# Patient Record
Sex: FEMALE | Race: WHITE | NOT HISPANIC OR LATINO | Employment: UNEMPLOYED | URBAN - NONMETROPOLITAN AREA
[De-identification: names, ages, dates, MRNs, and addresses within clinical notes are randomized per-mention and may not be internally consistent; named-entity substitution may affect disease eponyms.]

---

## 2019-06-19 ENCOUNTER — APPOINTMENT (EMERGENCY)
Dept: RADIOLOGY | Facility: HOSPITAL | Age: 59
End: 2019-06-19
Payer: COMMERCIAL

## 2019-06-19 ENCOUNTER — APPOINTMENT (EMERGENCY)
Dept: CT IMAGING | Facility: HOSPITAL | Age: 59
End: 2019-06-19
Payer: COMMERCIAL

## 2019-06-19 ENCOUNTER — HOSPITAL ENCOUNTER (EMERGENCY)
Facility: HOSPITAL | Age: 59
Discharge: HOME | End: 2019-06-19
Attending: EMERGENCY MEDICINE | Admitting: EMERGENCY MEDICINE
Payer: COMMERCIAL

## 2019-06-19 VITALS
TEMPERATURE: 98.2 F | HEIGHT: 67 IN | HEART RATE: 96 BPM | DIASTOLIC BLOOD PRESSURE: 58 MMHG | OXYGEN SATURATION: 97 % | WEIGHT: 172.84 LBS | BODY MASS INDEX: 27.13 KG/M2 | RESPIRATION RATE: 20 BRPM | SYSTOLIC BLOOD PRESSURE: 135 MMHG

## 2019-06-19 DIAGNOSIS — R07.89 ATYPICAL CHEST PAIN: Primary | ICD-10-CM

## 2019-06-19 LAB
ALBUMIN SERPL BCP-MCNC: 3.9 G/DL (ref 3.5–5)
ALP SERPL-CCNC: 102 U/L (ref 46–116)
ALT SERPL W P-5'-P-CCNC: 38 U/L (ref 12–78)
ANION GAP SERPL CALCULATED.3IONS-SCNC: 12 MMOL/L (ref 4–13)
AST SERPL W P-5'-P-CCNC: 18 U/L (ref 5–45)
ATRIAL RATE: 111 BPM
BASOPHILS # BLD AUTO: 0.06 THOUSANDS/ΜL (ref 0–0.1)
BASOPHILS NFR BLD AUTO: 1 % (ref 0–1)
BILIRUB SERPL-MCNC: 0.3 MG/DL (ref 0.2–1)
BUN SERPL-MCNC: 12 MG/DL (ref 5–25)
CALCIUM SERPL-MCNC: 9.6 MG/DL (ref 8.3–10.1)
CHLORIDE SERPL-SCNC: 100 MMOL/L (ref 100–108)
CO2 SERPL-SCNC: 24 MMOL/L (ref 21–32)
CREAT SERPL-MCNC: 1.1 MG/DL (ref 0.6–1.3)
EOSINOPHIL # BLD AUTO: 0.16 THOUSAND/ΜL (ref 0–0.61)
EOSINOPHIL NFR BLD AUTO: 2 % (ref 0–6)
ERYTHROCYTE [DISTWIDTH] IN BLOOD BY AUTOMATED COUNT: 14.4 % (ref 11.6–15.1)
GFR SERPL CREATININE-BSD FRML MDRD: 55 ML/MIN/1.73SQ M
GLUCOSE SERPL-MCNC: 101 MG/DL (ref 65–140)
HCT VFR BLD AUTO: 41 % (ref 34.8–46.1)
HGB BLD-MCNC: 13.3 G/DL (ref 11.5–15.4)
HOLD SPECIMEN: NORMAL
IMM GRANULOCYTES # BLD AUTO: 0.08 THOUSAND/UL (ref 0–0.2)
IMM GRANULOCYTES NFR BLD AUTO: 1 % (ref 0–2)
LYMPHOCYTES # BLD AUTO: 3.97 THOUSANDS/ΜL (ref 0.6–4.47)
LYMPHOCYTES NFR BLD AUTO: 39 % (ref 14–44)
MCH RBC QN AUTO: 29.6 PG (ref 26.8–34.3)
MCHC RBC AUTO-ENTMCNC: 32.4 G/DL (ref 31.4–37.4)
MCV RBC AUTO: 91 FL (ref 82–98)
MONOCYTES # BLD AUTO: 0.44 THOUSAND/ΜL (ref 0.17–1.22)
MONOCYTES NFR BLD AUTO: 4 % (ref 4–12)
NEUTROPHILS # BLD AUTO: 5.53 THOUSANDS/ΜL (ref 1.85–7.62)
NEUTS SEG NFR BLD AUTO: 53 % (ref 43–75)
NRBC BLD AUTO-RTO: 0 /100 WBCS
P AXIS: 52 DEGREES
PLATELET # BLD AUTO: 264 THOUSANDS/UL (ref 149–390)
PMV BLD AUTO: 8 FL (ref 8.9–12.7)
POTASSIUM SERPL-SCNC: 3.9 MMOL/L (ref 3.5–5.3)
PR INTERVAL: 144 MS
PROT SERPL-MCNC: 7.9 G/DL (ref 6.4–8.2)
QRS AXIS: 18 DEGREES
QRSD INTERVAL: 88 MS
QT INTERVAL: 336 MS
QTC INTERVAL: 456 MS
RBC # BLD AUTO: 4.49 MILLION/UL (ref 3.81–5.12)
SODIUM SERPL-SCNC: 136 MMOL/L (ref 136–145)
T WAVE AXIS: 57 DEGREES
TROPONIN I SERPL-MCNC: <0.02 NG/ML
TROPONIN I SERPL-MCNC: <0.02 NG/ML
VENTRICULAR RATE: 111 BPM
WBC # BLD AUTO: 10.24 THOUSAND/UL (ref 4.31–10.16)

## 2019-06-19 PROCEDURE — 99284 EMERGENCY DEPT VISIT MOD MDM: CPT | Performed by: EMERGENCY MEDICINE

## 2019-06-19 PROCEDURE — 99285 EMERGENCY DEPT VISIT HI MDM: CPT

## 2019-06-19 PROCEDURE — 94640 AIRWAY INHALATION TREATMENT: CPT

## 2019-06-19 PROCEDURE — 84484 ASSAY OF TROPONIN QUANT: CPT | Performed by: EMERGENCY MEDICINE

## 2019-06-19 PROCEDURE — 71046 X-RAY EXAM CHEST 2 VIEWS: CPT

## 2019-06-19 PROCEDURE — 80053 COMPREHEN METABOLIC PANEL: CPT

## 2019-06-19 PROCEDURE — 36415 COLL VENOUS BLD VENIPUNCTURE: CPT | Performed by: EMERGENCY MEDICINE

## 2019-06-19 PROCEDURE — 93010 ELECTROCARDIOGRAM REPORT: CPT | Performed by: INTERNAL MEDICINE

## 2019-06-19 PROCEDURE — 36415 COLL VENOUS BLD VENIPUNCTURE: CPT

## 2019-06-19 PROCEDURE — 93005 ELECTROCARDIOGRAM TRACING: CPT

## 2019-06-19 PROCEDURE — 85025 COMPLETE CBC W/AUTO DIFF WBC: CPT

## 2019-06-19 PROCEDURE — 84484 ASSAY OF TROPONIN QUANT: CPT

## 2019-06-19 PROCEDURE — 71275 CT ANGIOGRAPHY CHEST: CPT

## 2019-06-19 RX ORDER — NITROGLYCERIN 0.4 MG/1
0.4 TABLET SUBLINGUAL ONCE
Status: COMPLETED | OUTPATIENT
Start: 2019-06-19 | End: 2019-06-19

## 2019-06-19 RX ORDER — LORAZEPAM 2 MG/ML
1.5 INJECTION INTRAMUSCULAR ONCE
Status: DISCONTINUED | OUTPATIENT
Start: 2019-06-19 | End: 2019-06-19

## 2019-06-19 RX ORDER — IPRATROPIUM BROMIDE AND ALBUTEROL SULFATE 2.5; .5 MG/3ML; MG/3ML
3 SOLUTION RESPIRATORY (INHALATION) ONCE
Status: DISCONTINUED | OUTPATIENT
Start: 2019-06-19 | End: 2019-06-19

## 2019-06-19 RX ORDER — ASPIRIN 81 MG/1
324 TABLET, CHEWABLE ORAL ONCE
Status: DISCONTINUED | OUTPATIENT
Start: 2019-06-19 | End: 2019-06-19

## 2019-06-19 RX ORDER — CLONAZEPAM 0.5 MG/1
1 TABLET ORAL ONCE
Status: COMPLETED | OUTPATIENT
Start: 2019-06-19 | End: 2019-06-19

## 2019-06-19 RX ORDER — IPRATROPIUM BROMIDE AND ALBUTEROL SULFATE 2.5; .5 MG/3ML; MG/3ML
3 SOLUTION RESPIRATORY (INHALATION) ONCE
Status: COMPLETED | OUTPATIENT
Start: 2019-06-19 | End: 2019-06-19

## 2019-06-19 RX ADMIN — NITROGLYCERIN 0.4 MG: 0.4 TABLET SUBLINGUAL at 16:48

## 2019-06-19 RX ADMIN — CLONAZEPAM 1 MG: 0.5 TABLET ORAL at 17:34

## 2019-06-19 RX ADMIN — IPRATROPIUM BROMIDE AND ALBUTEROL SULFATE 3 ML: 2.5; .5 SOLUTION RESPIRATORY (INHALATION) at 13:08

## 2019-06-19 RX ADMIN — IOHEXOL 85 ML: 350 INJECTION, SOLUTION INTRAVENOUS at 14:28

## 2019-07-18 ENCOUNTER — HOSPITAL ENCOUNTER (EMERGENCY)
Facility: HOSPITAL | Age: 59
Discharge: HOME | End: 2019-07-18
Attending: EMERGENCY MEDICINE | Admitting: EMERGENCY MEDICINE
Payer: COMMERCIAL

## 2019-07-18 VITALS
SYSTOLIC BLOOD PRESSURE: 104 MMHG | HEART RATE: 104 BPM | BODY MASS INDEX: 28.03 KG/M2 | HEIGHT: 67 IN | TEMPERATURE: 98.4 F | DIASTOLIC BLOOD PRESSURE: 72 MMHG | WEIGHT: 178.57 LBS | OXYGEN SATURATION: 98 % | RESPIRATION RATE: 20 BRPM

## 2019-07-18 DIAGNOSIS — F41.9 ANXIETY: Primary | ICD-10-CM

## 2019-07-18 PROCEDURE — 99284 EMERGENCY DEPT VISIT MOD MDM: CPT | Performed by: PHYSICIAN ASSISTANT

## 2019-07-18 PROCEDURE — 99283 EMERGENCY DEPT VISIT LOW MDM: CPT

## 2019-07-18 RX ORDER — QUETIAPINE FUMARATE 400 MG/1
800 TABLET, FILM COATED ORAL
COMMUNITY
Start: 2019-06-03

## 2019-07-18 RX ORDER — HYDROXYZINE HYDROCHLORIDE 25 MG/1
25 TABLET, FILM COATED ORAL ONCE
Status: DISCONTINUED | OUTPATIENT
Start: 2019-07-18 | End: 2019-07-18 | Stop reason: HOSPADM

## 2019-07-18 RX ORDER — ASPIRIN 81 MG/1
81 TABLET ORAL DAILY
COMMUNITY

## 2019-07-18 RX ORDER — LORAZEPAM 1 MG/1
1 TABLET ORAL ONCE
Status: COMPLETED | OUTPATIENT
Start: 2019-07-18 | End: 2019-07-18

## 2019-07-18 RX ORDER — OMEPRAZOLE 20 MG/1
20 TABLET, DELAYED RELEASE ORAL DAILY
COMMUNITY
Start: 2019-05-29

## 2019-07-18 RX ORDER — ALPRAZOLAM 1 MG/1
TABLET ORAL
COMMUNITY
Start: 2011-04-08

## 2019-07-18 RX ORDER — PAROXETINE HYDROCHLORIDE 40 MG/1
40 TABLET, FILM COATED ORAL DAILY
COMMUNITY
Start: 2019-05-14

## 2019-07-18 RX ORDER — CLONAZEPAM 0.5 MG/1
0.5 TABLET ORAL 2 TIMES DAILY PRN
COMMUNITY
Start: 2019-06-28 | End: 2019-07-28

## 2019-07-18 RX ORDER — ALBUTEROL SULFATE 90 UG/1
AEROSOL, METERED RESPIRATORY (INHALATION)
COMMUNITY
Start: 2013-11-04

## 2019-07-18 RX ADMIN — LORAZEPAM 1 MG: 1 TABLET ORAL at 19:11

## 2019-07-18 NOTE — ED PROVIDER NOTES
History  Chief Complaint   Patient presents with    Anxiety     pt c/o increased anxiety, pcp on vacation, and instructed to come to ER for eval       62year old female presents with spouse for evaluation of increased anxiety  She notes this has been a chronic condition for her for the past 16 years  She notes she was taking seroquel 300mg daily as well as xanax 1 mg four times a day  She states she moved to [place] 2 5 years ago and then decided to move back  She was previously seen by psychiatry who she states put her on the regimen of seroquel and xanax which helped  She then notes her PCP Dr Jerrod Sullivan took over prescribing her meds before she moved to [place]  She notes since returning and seeing him, he has refused to give her xanax and she's been prescribed klonopin 0 5 mg twice a day, which pt notes "is like taking a lifesaver" and her seroquel was increased to 800 mg, which she states doesn't agree with her  C/o severe anxiety  She states she's been afraid to leave the house and this has made her more depressed  Denies SI/HI  She describes herself as feeling restless, shakey, irritable, anxious  She states "I just want to get back on the meds that worked for me"  History provided by:  Patient   used: No    Anxiety   Presenting symptoms: agitation and depression    Presenting symptoms: no aggressive behavior, no bizarre behavior, no delusions, no disorganized speech, no hallucinations, no homicidal ideas, no paranoid behavior, no self-mutilation and no suicidal thoughts    Patient accompanied by: spouse    Progression:  Worsening  Chronicity:  Chronic  Context: stressful life event    Associated symptoms: anxiety    Associated symptoms: no abdominal pain, no chest pain, no fatigue and no headaches    Risk factors: hx of mental illness    Risk factors: no hx of suicide attempts and no recent psychiatric admission        Prior to Admission Medications Prescriptions Last Dose Informant Patient Reported? Taking? ALPRAZolam (XANAX) 1 mg tablet Not Taking at Unknown time  Yes No   Sig: Take by mouth   PARoxetine (PAXIL) 40 MG tablet   Yes Yes   Sig: Take 40 mg by mouth daily   QUEtiapine (SEROquel) 400 MG tablet   Yes Yes   Sig: Take 800 mg by mouth   albuterol (PROAIR HFA) 90 mcg/act inhaler Not Taking at Unknown time  Yes No   Sig: Inhale   aspirin (ECOTRIN LOW STRENGTH) 81 mg EC tablet   Yes Yes   Sig: Take 81 mg by mouth daily   clonazePAM (KLONOPIN) 0 5 mg tablet Not Taking at Unknown time  Yes No   Sig: Take 0 5 mg by mouth 2 (two) times a day as needed   omeprazole (PRILOSEC OTC) 20 MG tablet   Yes Yes   Sig: Take 20 mg by mouth daily   tiotropium (SPIRIVA) 18 mcg inhalation capsule   Yes Yes   Sig: Place 18 mcg into inhaler and inhale daily      Facility-Administered Medications: None       Past Medical History:   Diagnosis Date    [facility]ascular necrosis ([facility] )     Steward's esophagus     Cancer ([place] [address] )     COPD (chronic obstructive pulmonary disease) (HCC)     Factor 5 Leiden mutation, heterozygous ([place] [address] )     Hyperlipidemia        Past Surgical History:   Procedure Laterality Date    CHOLECYSTECTOMY      TONSILLECTOMY         History reviewed  No pertinent family history  I have reviewed and agree with the history as documented  Social History     Tobacco Use    Smoking status: Current Every Day Smoker     Packs/day: 0 50     Types: Cigarettes    Smokeless tobacco: Never Used   Substance Use Topics    Alcohol use: Not Currently    Drug use: Not Currently        Review of Systems   Constitutional: Negative  Negative for chills, fatigue and fever  HENT: Negative  Negative for postnasal drip, rhinorrhea and sore throat  Eyes: Negative  Negative for visual disturbance  Respiratory: Negative  Negative for cough, shortness of breath and wheezing  Cardiovascular: Negative  Negative for chest pain, palpitations and leg swelling  Gastrointestinal: Negative  Negative for abdominal pain, constipation, diarrhea, nausea and vomiting  Genitourinary: Negative  Negative for dysuria, flank pain and hematuria  Musculoskeletal: Positive for neck pain  Negative for back pain and myalgias  Skin: Negative  Negative for rash  Neurological: Negative  Negative for dizziness, syncope, weakness, light-headedness, numbness and headaches  Psychiatric/Behavioral: Positive for agitation, dysphoric mood and sleep disturbance  Negative for confusion, hallucinations, homicidal ideas, paranoia, self-injury and suicidal ideas  The patient is nervous/anxious  All other systems reviewed and are negative  Physical Exam  Physical Exam   Constitutional: She is oriented to person, place, and time  She appears well-developed and well-nourished  No distress  HENT:   Head: Normocephalic and atraumatic  Right Ear: External ear normal    Left Ear: External ear normal    Nose: Nose normal    Mouth/Throat: Uvula is midline, oropharynx is clear and moist and mucous membranes are normal  No oropharyngeal exudate  Eyes: Pupils are equal, round, and reactive to light  Conjunctivae and EOM are normal  No scleral icterus  Neck: Trachea normal and normal range of motion  Neck supple  No tracheal deviation present  Cardiovascular: Normal rate, regular rhythm, normal heart sounds and normal pulses  No murmur heard  Pulmonary/Chest: Effort normal and breath sounds normal  No respiratory distress  She has no wheezes  She has no rhonchi  She has no rales  Abdominal: Soft  Bowel sounds are normal  There is no tenderness  There is no rebound, no guarding and no CVA tenderness  Musculoskeletal: Normal range of motion  She exhibits no edema or tenderness  Neurological: She is alert and oriented to person, place, and time  No cranial nerve deficit or sensory deficit  She exhibits normal muscle tone  Skin: Skin is warm and dry   Capillary refill takes less than 2 seconds  No rash noted  Psychiatric: Her speech is normal and behavior is normal  Her mood appears anxious  She is not actively hallucinating  She expresses no homicidal and no suicidal ideation  Nursing note and vitals reviewed  Vital Signs  ED Triage Vitals [07/18/19 1753]   Temperature Pulse Respirations Blood Pressure SpO2   98 4 °F (36 9 °C) (!) 106 20 161/79 98 %      Temp Source Heart Rate Source Patient Position - Orthostatic VS BP Location FiO2 (%)   Temporal Monitor Sitting Right arm --      Pain Score       9           Vitals:    07/18/19 1753 07/18/19 1800 07/18/19 1830 07/18/19 1900   BP: 161/79 (!) 151/106 136/58 104/72   Pulse: (!) 106 (!) 106 104 104   Patient Position - Orthostatic VS: Sitting Sitting Sitting Sitting         Visual Acuity      ED Medications  Medications   hydrOXYzine HCL (ATARAX) tablet 25 mg (25 mg Oral Not Given 7/18/19 1858)   LORazepam (ATIVAN) tablet 1 mg (1 mg Oral Given 7/18/19 1911)       Diagnostic Studies  Results Reviewed     None                 No orders to display              Procedures  Procedures       ED Course  ED Course as of Jul 18 2109   Thu Jul 18, 2019   Sarina Rosa [facility] Previous ER records here reviewed  Had recent full work up to include CTA chest to r/o PE  Pt denying chest pain or SOB  Her only complaint is anxiety related to being off her meds  Pt refused the hydroxyzine  Discussed PDMP findings with pt and spouse and concerns re: benzos  She does admit that she's been out of the klonopin as she's been taking two at a time to help  She states the hydroxyzine does nothing for her as her  already takes that  Expressed my concerns regarding her use of benzos, going to multiple providers to get scripts, using more than directed, etc   She feels her currently prescribed dose is not effective compared to xanax 1 mg QID  On PDMP, I did relay to her that she hasn't been given a script for xanax since 12/2018 and it wasn't that dose    She notes she was out of it for several months  1859 She is not suicidal or felt to be a harm to herself  She declines inpatient psychiatric admission and states she "just wants to get back on the meds she was on"  Discussed with pt that I would not be giving her a printed Rx for any benzos  She requested at least a dose here in the ED to help calm her down until she can call her doctor's office tomorrow for follow up  She was given ativan 1 mg POX1  She was deemed appropriate for discharge home and outpatient follow up with her PCP  Also recommended consultation with psychiatry  She left in stable condition with her spouse  Pt verbalized understanding and had no further questions  PDMP was queried to include [place] in report  Pt last received xanax 12/2018 and was 0 5 mg dose, #30  She received multiple benzo Rx's from different prescribers and didn't have a consistent dose  She received clonopin 0 5 mg, #60 from Dr Kareem Hayes on 5/29/19 and 6/26/19 both times  She does admit to being out of the clonopin as she was taking 2 tabs at a time to achieve a better affect  MDM  Number of Diagnoses or Management Options  Anxiety: established and worsening     Amount and/or Complexity of Data Reviewed  Decide to obtain previous medical records or to obtain history from someone other than the patient: yes  Review and summarize past medical records: yes    Patient Progress  Patient progress: stable      Disposition  Final diagnoses:   Anxiety     Time reflects when diagnosis was documented in both MDM as applicable and the Disposition within this note     Time User Action Codes Description Comment    7/18/2019  7:03 PM Tara Gray Add [F41 9] Anxiety       ED Disposition     ED Disposition Condition Date/Time Comment    Discharge Stable Thu Jul 18, 2019  7:03 PM Azalia Keita discharge to home/self care              Follow-up Information     Follow up With Specialties Details Why Contact Info Additional Information    Tab Araujo MD Internal Medicine Schedule an appointment as soon as possible for a visit   [address] [facility] [address]  [phone]       [address] [facility] [address] [facility] HEART AND SURGICAL [facility] Schedule an appointment as soon as possible for a visit   [address] [facility] [facility] [address]  [address] [facility], [address] [facility], [address] [place], [place], [address]          Discharge Medication List as of 7/18/2019  7:07 PM      CONTINUE these medications which have NOT CHANGED    Details   aspirin (ECOTRIN LOW STRENGTH) 81 mg EC tablet Take 81 mg by mouth daily, Historical Med      omeprazole (PRILOSEC OTC) 20 MG tablet Take 20 mg by mouth daily, Starting Wed 5/29/2019, Historical Med      PARoxetine (PAXIL) 40 MG tablet Take 40 mg by mouth daily, Starting Tue 5/14/2019, Historical Med      QUEtiapine (SEROquel) 400 MG tablet Take 800 mg by mouth, Starting Mon 6/3/2019, Historical Med      tiotropium (SPIRIVA) 18 mcg inhalation capsule Place 18 mcg into inhaler and inhale daily, Historical Med      albuterol (PROAIR HFA) 90 mcg/act inhaler Inhale, Starting Mon 11/4/2013, Historical Med      ALPRAZolam (XANAX) 1 mg tablet Take by mouth, Starting Fri 4/8/2011, Historical Med      clonazePAM (KLONOPIN) 0 5 mg tablet Take 0 5 mg by mouth 2 (two) times a day as needed, Starting Fri 6/28/2019, Until Sun 7/28/2019, Historical Med           No discharge procedures on file      ED Provider  Electronically Signed by           Mandie Caruso PA-C  07/18/19 8721

## 2020-01-30 ENCOUNTER — HOSPITAL ENCOUNTER (EMERGENCY)
Facility: HOSPITAL | Age: 60
Discharge: LEFT BEFORE DISCHARGE | End: 2020-01-30
Admitting: EMERGENCY MEDICINE
Payer: COMMERCIAL

## 2020-01-30 ENCOUNTER — APPOINTMENT (EMERGENCY)
Dept: RADIOLOGY | Facility: HOSPITAL | Age: 60
End: 2020-01-30
Payer: COMMERCIAL

## 2020-01-30 ENCOUNTER — APPOINTMENT (EMERGENCY)
Dept: CT IMAGING | Facility: HOSPITAL | Age: 60
End: 2020-01-30
Payer: COMMERCIAL

## 2020-01-30 VITALS
TEMPERATURE: 98.3 F | SYSTOLIC BLOOD PRESSURE: 124 MMHG | HEART RATE: 107 BPM | DIASTOLIC BLOOD PRESSURE: 71 MMHG | WEIGHT: 182.76 LBS | RESPIRATION RATE: 19 BRPM | BODY MASS INDEX: 28.62 KG/M2 | OXYGEN SATURATION: 96 %

## 2020-01-30 DIAGNOSIS — R06.02 SHORTNESS OF BREATH: Primary | ICD-10-CM

## 2020-01-30 LAB
ANION GAP SERPL CALCULATED.3IONS-SCNC: 9 MMOL/L (ref 4–13)
BASOPHILS # BLD AUTO: 0.04 THOUSANDS/ΜL (ref 0–0.1)
BASOPHILS NFR BLD AUTO: 1 % (ref 0–1)
BUN SERPL-MCNC: 13 MG/DL (ref 5–25)
CALCIUM SERPL-MCNC: 9.2 MG/DL (ref 8.3–10.1)
CHLORIDE SERPL-SCNC: 103 MMOL/L (ref 100–108)
CO2 SERPL-SCNC: 23 MMOL/L (ref 21–32)
CREAT SERPL-MCNC: 1.06 MG/DL (ref 0.6–1.3)
D DIMER PPP FEU-MCNC: 2.84 UG/ML FEU
EOSINOPHIL # BLD AUTO: 0 THOUSAND/ΜL (ref 0–0.61)
EOSINOPHIL NFR BLD AUTO: 0 % (ref 0–6)
ERYTHROCYTE [DISTWIDTH] IN BLOOD BY AUTOMATED COUNT: 12.5 % (ref 11.6–15.1)
GFR SERPL CREATININE-BSD FRML MDRD: 58 ML/MIN/1.73SQ M
GLUCOSE SERPL-MCNC: 110 MG/DL (ref 65–140)
HCT VFR BLD AUTO: 33.3 % (ref 34.8–46.1)
HGB BLD-MCNC: 11 G/DL (ref 11.5–15.4)
HOLD SPECIMEN: NORMAL
IMM GRANULOCYTES # BLD AUTO: 0.05 THOUSAND/UL (ref 0–0.2)
IMM GRANULOCYTES NFR BLD AUTO: 1 % (ref 0–2)
LYMPHOCYTES # BLD AUTO: 2.25 THOUSANDS/ΜL (ref 0.6–4.47)
LYMPHOCYTES NFR BLD AUTO: 31 % (ref 14–44)
MCH RBC QN AUTO: 30.6 PG (ref 26.8–34.3)
MCHC RBC AUTO-ENTMCNC: 33 G/DL (ref 31.4–37.4)
MCV RBC AUTO: 93 FL (ref 82–98)
MONOCYTES # BLD AUTO: 0.42 THOUSAND/ΜL (ref 0.17–1.22)
MONOCYTES NFR BLD AUTO: 6 % (ref 4–12)
NEUTROPHILS # BLD AUTO: 4.55 THOUSANDS/ΜL (ref 1.85–7.62)
NEUTS SEG NFR BLD AUTO: 61 % (ref 43–75)
NRBC BLD AUTO-RTO: 0 /100 WBCS
PLATELET # BLD AUTO: 252 THOUSANDS/UL (ref 149–390)
PMV BLD AUTO: 8 FL (ref 8.9–12.7)
POTASSIUM SERPL-SCNC: 3.7 MMOL/L (ref 3.5–5.3)
RBC # BLD AUTO: 3.6 MILLION/UL (ref 3.81–5.12)
SODIUM SERPL-SCNC: 135 MMOL/L (ref 136–145)
TROPONIN I SERPL-MCNC: <0.02 NG/ML
WBC # BLD AUTO: 7.31 THOUSAND/UL (ref 4.31–10.16)

## 2020-01-30 PROCEDURE — 85025 COMPLETE CBC W/AUTO DIFF WBC: CPT | Performed by: STUDENT IN AN ORGANIZED HEALTH CARE EDUCATION/TRAINING PROGRAM

## 2020-01-30 PROCEDURE — 84484 ASSAY OF TROPONIN QUANT: CPT | Performed by: STUDENT IN AN ORGANIZED HEALTH CARE EDUCATION/TRAINING PROGRAM

## 2020-01-30 PROCEDURE — 71046 X-RAY EXAM CHEST 2 VIEWS: CPT

## 2020-01-30 PROCEDURE — 36415 COLL VENOUS BLD VENIPUNCTURE: CPT | Performed by: STUDENT IN AN ORGANIZED HEALTH CARE EDUCATION/TRAINING PROGRAM

## 2020-01-30 PROCEDURE — 99284 EMERGENCY DEPT VISIT MOD MDM: CPT | Performed by: EMERGENCY MEDICINE

## 2020-01-30 PROCEDURE — 99284 EMERGENCY DEPT VISIT MOD MDM: CPT

## 2020-01-30 PROCEDURE — 85379 FIBRIN DEGRADATION QUANT: CPT | Performed by: STUDENT IN AN ORGANIZED HEALTH CARE EDUCATION/TRAINING PROGRAM

## 2020-01-30 PROCEDURE — 80048 BASIC METABOLIC PNL TOTAL CA: CPT | Performed by: STUDENT IN AN ORGANIZED HEALTH CARE EDUCATION/TRAINING PROGRAM

## 2020-01-30 RX ORDER — ALBUTEROL SULFATE 2.5 MG/3ML
2.5 SOLUTION RESPIRATORY (INHALATION) EVERY 6 HOURS PRN
Qty: 30 VIAL | Refills: 0 | Status: SHIPPED | OUTPATIENT
Start: 2020-01-30

## 2020-01-30 RX ORDER — IPRATROPIUM BROMIDE AND ALBUTEROL SULFATE 2.5; .5 MG/3ML; MG/3ML
3 SOLUTION RESPIRATORY (INHALATION) ONCE
Status: COMPLETED | OUTPATIENT
Start: 2020-01-30 | End: 2020-01-30

## 2020-01-30 RX ORDER — METHYLPREDNISOLONE 4 MG/1
TABLET ORAL
Qty: 21 TABLET | Refills: 0 | Status: SHIPPED | OUTPATIENT
Start: 2020-01-30

## 2020-01-30 RX ORDER — AZITHROMYCIN 250 MG/1
TABLET, FILM COATED ORAL
Qty: 6 TABLET | Refills: 0 | Status: SHIPPED | OUTPATIENT
Start: 2020-01-30 | End: 2020-02-03

## 2020-01-30 RX ORDER — IPRATROPIUM BROMIDE AND ALBUTEROL SULFATE 2.5; .5 MG/3ML; MG/3ML
3 SOLUTION RESPIRATORY (INHALATION)
Status: DISCONTINUED | OUTPATIENT
Start: 2020-01-30 | End: 2020-01-30

## 2020-01-30 RX ADMIN — IPRATROPIUM BROMIDE AND ALBUTEROL SULFATE 3 ML: 2.5; .5 SOLUTION RESPIRATORY (INHALATION) at 12:41

## 2020-01-30 NOTE — ED ATTENDING ATTESTATION
1/30/2020  I, Enma Love MD, saw and evaluated the patient  I have discussed the patient with the resident/non-physician practitioner and agree with the resident's/non-physician practitioner's findings, Plan of Care, and MDM as documented in the resident's/non-physician practitioner's note, except where noted  All available labs and Radiology studies were reviewed  I was present for key portions of any procedure(s) performed by the resident/non-physician practitioner and I was immediately available to provide assistance  At this point I agree with the current assessment done in the Emergency Department  I have conducted an independent evaluation of this patient a history and physical is as follows:    62 yo female presents with increased shortness of breath over the last week  Patient has history of COPD she ran out of her albuterol nebulizer solution was been using her albuterol meter dose inhaler she has had upper respiratory symptoms she has also been bothered by chest congestion  No fever or chills  ocassional chest pain; np pior hx of DVT, PE    O: VS reviewed  Chest examined after aerogen neb  Good excursion but insp/exp wheezes  CV s1s3 no murmurs   abdm soft  Ext without edema    ED Course      DDx; copd exacerbation, pneumonia, acs, PE    CXR reviewed minimal bibasilar atelectasis vs  Prior 6/19/19  Labs reviewed patient has elevated d-dimer; patient expressed desire to leave prior to completing, EKG, CTA Chest  Dr Jose Paiz reviewed risks benefits of leaving against medical advice    Patient aware can return at any time for re-eval recommend f/u with PMD asap   She will be prescirbed nebulizer soln,steriod burst, and zithromax     IMP: copd exacerbation  Plan: left against medical advice       Critical Care Time  Procedures

## 2020-01-30 NOTE — ED PROVIDER NOTES
History  Chief Complaint   Patient presents with    Cough     Patient has been experiencing a cough for about 1 week, patient admits to SOB with cough and exertion  Patient also concerned about a "lump" in abdomen which is not new     HPI:  59-year-old female presents emergency room for 1 week shortness of breath  Patient states approximately 1 week ago symptoms started with URI like symptoms, , nasal congestion sinus pressure, runny nose which has progressively worsened regarding respiratory status  She has been taking home albuterol with mild relief symptoms, does have a home nebulizer however has no more medication has not been taking it  Denies any recent travel, denies any sick contacts  She is a current smoker with previous diagnosis of COPD and asthma  Has never required hospitalization or intubation  Additionally she is endorsing some chest congestion/discomfort  Prior to Admission Medications   Prescriptions Last Dose Informant Patient Reported? Taking?    ALPRAZolam (XANAX) 1 mg tablet   Yes No   Sig: Take by mouth   PARoxetine (PAXIL) 40 MG tablet   Yes No   Sig: Take 40 mg by mouth daily   QUEtiapine (SEROquel) 400 MG tablet   Yes No   Sig: Take 800 mg by mouth   albuterol (PROAIR HFA) 90 mcg/act inhaler   Yes No   Sig: Inhale   aspirin (ECOTRIN LOW STRENGTH) 81 mg EC tablet   Yes No   Sig: Take 81 mg by mouth daily   omeprazole (PRILOSEC OTC) 20 MG tablet   Yes No   Sig: Take 20 mg by mouth daily   tiotropium (SPIRIVA) 18 mcg inhalation capsule   Yes No   Sig: Place 18 mcg into inhaler and inhale daily      Facility-Administered Medications: None       Past Medical History:   Diagnosis Date    Av[facility] necrosis ([facility] )     Steward's esophagus     Cancer ([facility] )     COPD (chronic obstructive pulmonary disease) (HCC)     Factor 5 Leiden mutation, heterozygous ([facility] )     Hyperlipidemia        Past Surgical History:   Procedure Laterality Date    CHOLECYSTECTOMY      TONSILLECTOMY History reviewed  No pertinent family history  I have reviewed and agree with the history as documented  Social History     Tobacco Use    Smoking status: Current Every Day Smoker     Packs/day: 0 50     Types: Cigarettes    Smokeless tobacco: Never Used   Substance Use Topics    Alcohol use: Not Currently    Drug use: Not Currently        Review of Systems   Constitutional: Negative for activity change, appetite change, chills, diaphoresis, fatigue, fever and unexpected weight change  HENT: Positive for congestion, ear pain, postnasal drip and sinus pain  Negative for dental problem, drooling, ear discharge, facial swelling, hearing loss, mouth sores, nosebleeds, rhinorrhea, sinus pressure, sneezing, sore throat, tinnitus, trouble swallowing and voice change  Eyes: Negative for photophobia, pain, discharge, redness, itching and visual disturbance  Respiratory: Positive for cough, shortness of breath and wheezing  Negative for apnea and chest tightness  Cardiovascular: Positive for chest pain  Negative for palpitations and leg swelling  Gastrointestinal: Negative for abdominal distention, abdominal pain, anal bleeding, constipation, diarrhea and nausea  Endocrine: Negative for cold intolerance, heat intolerance and polydipsia  Genitourinary: Negative for difficulty urinating  Musculoskeletal: Negative for arthralgias, gait problem, joint swelling and myalgias  Skin: Negative for color change and pallor  Allergic/Immunologic: Negative for immunocompromised state  Neurological: Negative for dizziness, seizures, facial asymmetry, weakness, light-headedness, numbness and headaches  Psychiatric/Behavioral: Negative for agitation, behavioral problems, confusion, decreased concentration and dysphoric mood         Physical Exam  ED Triage Vitals [01/30/20 1132]   Temperature Pulse Respirations Blood Pressure SpO2   98 3 °F (36 8 °C) (!) 107 19 124/71 96 %      Temp Source Heart Rate Source Patient Position - Orthostatic VS BP Location FiO2 (%)   Temporal Monitor Sitting Right arm --      Pain Score       8             Orthostatic Vital Signs  Vitals:    01/30/20 1132   BP: 124/71   Pulse: (!) 107   Patient Position - Orthostatic VS: Sitting       Physical Exam   Constitutional: She is oriented to person, place, and time  She appears well-developed and well-nourished  No distress  She is not intubated  HENT:   Head: Normocephalic and atraumatic  Nose: Mucosal edema and rhinorrhea present  Right sinus exhibits no maxillary sinus tenderness and no frontal sinus tenderness  Left sinus exhibits no maxillary sinus tenderness and no frontal sinus tenderness  Mouth/Throat: Mucous membranes are normal  Posterior oropharyngeal erythema present  Eyes: Pupils are equal, round, and reactive to light  Conjunctivae and EOM are normal    Neck: Normal range of motion  Neck supple  No JVD present  No tracheal deviation present  No thyromegaly present  Cardiovascular: Normal rate, regular rhythm and normal heart sounds  Exam reveals no friction rub  No murmur heard  Pulmonary/Chest: Effort normal  No accessory muscle usage  No apnea, no tachypnea and no bradypnea  She is not intubated  No respiratory distress  She has decreased breath sounds  She has wheezes  She has rhonchi  Abdominal: Soft  Bowel sounds are normal  She exhibits no distension and no mass  There is no tenderness  There is no guarding  Musculoskeletal: Normal range of motion  She exhibits no edema  Lymphadenopathy:     She has no cervical adenopathy  Neurological: She is alert and oriented to person, place, and time  She exhibits normal muscle tone  Coordination normal    Skin: Skin is warm  Capillary refill takes less than 2 seconds  Psychiatric: She has a normal mood and affect   Her behavior is normal  Thought content normal        ED Medications  Medications   ipratropium-albuterol (DUO-NEB) 0 5-2 5 mg/3 mL inhalation solution 3 mL (3 mL Nebulization Given 1/30/20 1241)       Diagnostic Studies  Results Reviewed     Procedure Component Value Units Date/Time    [place] draw [[id number]] Collected:  01/30/20 1207    Lab Status:  Final result Specimen:  Blood from Arm, Left Updated:  01/30/20 1401    Narrative: The following orders were created for panel order [place] draw  Procedure                               Abnormality         Status                     ---------                               -----------         ------                     Nesha Flatten / Yellow tube on XZTS[[id number]]                      Final result                 Please view results for these tests on the individual orders      Basic metabolic panel [[id number]]  (Abnormal) Collected:  01/30/20 1207    Lab Status:  Final result Specimen:  Blood from Arm, Left Updated:  01/30/20 1303     Sodium 135 mmol/L      Potassium 3 7 mmol/L      Chloride 103 mmol/L      CO2 23 mmol/L      ANION GAP 9 mmol/L      BUN 13 mg/dL      Creatinine 1 06 mg/dL      Glucose 110 mg/dL      Calcium 9 2 mg/dL      eGFR 58 ml/min/1 73sq m     Narrative:       [place] guidelines for Chronic Kidney Disease (CKD):     Stage 1 with normal or high GFR (GFR > 90 mL/min/1 73 square meters)    Stage 2 Mild CKD (GFR = 60-89 mL/min/1 73 square meters)    Stage 3A Moderate CKD (GFR = 45-59 mL/min/1 73 square meters)    Stage 3B Moderate CKD (GFR = 30-44 mL/min/1 73 square meters)    Stage 4 Severe CKD (GFR = 15-29 mL/min/1 73 square meters)    Stage 5 End Stage CKD (GFR <15 mL/min/1 73 square meters)  Note: GFR calculation is accurate only with a steady state creatinine    Troponin I [[id number]]  (Normal) Collected:  01/30/20 1207    Lab Status:  Final result Specimen:  Blood from Arm, Left Updated:  01/30/20 1231     Troponin I <0 02 ng/mL     D-dimer, quantitative [[id number]]  (Abnormal) Collected:  01/30/20 1207    Lab Status:  Final result Specimen:  Blood from Arm, Left Updated:  01/30/20 1224     D-Dimer, Quant 2 84 ug/ml FEU     CBC and differential [[id number]]  (Abnormal) Collected:  01/30/20 1207    Lab Status:  Final result Specimen:  Blood from Arm, Left Updated:  01/30/20 1213     WBC 7 31 Thousand/uL      RBC 3 60 Million/uL      Hemoglobin 11 0 g/dL      Hematocrit 33 3 %      MCV 93 fL      MCH 30 6 pg      MCHC 33 0 g/dL      RDW 12 5 %      MPV 8 0 fL      Platelets 221 Thousands/uL      nRBC 0 /100 WBCs      Neutrophils Relative 61 %      Immat GRANS % 1 %      Lymphocytes Relative 31 %      Monocytes Relative 6 %      Eosinophils Relative 0 %      Basophils Relative 1 %      Neutrophils Absolute 4 55 Thousands/µL      Immature Grans Absolute 0 05 Thousand/uL      Lymphocytes Absolute 2 25 Thousands/µL      Monocytes Absolute 0 42 Thousand/µL      Eosinophils Absolute 0 00 Thousand/µL      Basophils Absolute 0 04 Thousands/µL                  XR chest 2 views   Final Result by Elmo Jenkins MD (01/30 1300)      Trace basilar atelectasis without other acute cardiopulmonary findings  Workstation performed: [id number]         CTA ED chest PE study    (Results Pending)         Procedures  Procedures      ED Course  ED Course as of Jan 30 1402   Thu Jan 30, 2020   1229 Positive Dimer, CTA Chest ordered       1400 Patient advised she would like to leave against medical advice  I spoke extensively with the patient, and concerned that she may have a pulmonary embolism given her elevated dimer which is why would like to do a CTA of the chest, and continue to monitor her respiratory status  Patient acknowledged, states she does not believe this is necessary and would like to leave against medical advice  Made her aware that should anything change or worsen she is to come back to the emergency department                    PERC Rule for PE      Most Recent Value   PERC Rule for PE   Age >=50  1 Filed at: 01/30/2020 1227   HR >=100  1 Filed at: 01/30/2020 1227   O2 Sat on room air < 95%  1 Filed at: 01/30/2020 1227   History of PE or DVT  0 Filed at: 01/30/2020 1227   Recent trauma or surgery  0 Filed at: 01/30/2020 1227   Hemoptysis  0 Filed at: 01/30/2020 1227   Exogenous estrogen  --   Unilateral leg swelling  0 Filed at: 01/30/2020 1227   PERC Rule for PE Results  3 Filed at: 01/30/2020 1227                Wells' Criteria for PE      Most Recent Value   Wells' Criteria for PE   Clinical signs and symptoms of DVT  0 Filed at: 01/30/2020 1227   PE is primary diagnosis or equally likely  0 Filed at: 01/30/2020 1227   HR >100  1 5 Filed at: 01/30/2020 1227   Immobilization at least 3 days or Surgery in the previous 4 weeks  0 Filed at: 01/30/2020 1227   Previous, objectively diagnosed PE or DVT  0 Filed at: 01/30/2020 1227   Hemoptysis  0 Filed at: 01/30/2020 1227   Malignancy with treatment within 6 months or palliative  0 Filed at: 01/30/2020 1227   Wells' Criteria Total  1 5 Filed at: 01/30/2020 1227            MDM  Number of Diagnoses or Management Options  Diagnosis management comments: Influenza, COPD, asthma, pneumonia, viral prodrome  Disposition  Final diagnoses:   Shortness of breath     Time reflects when diagnosis was documented in both MDM as applicable and the Disposition within this note     Time User Action Codes Description Comment    1/30/2020  2:01 PM Yarely Cooper Add [R06 02] Shortness of breath       ED Disposition     ED Disposition Condition Date/Time Comment    NAINSHARMILA Currie Jan 30, 2020  2:01 PM Date: 1/30/2020  Patient: Freya Prescott  Admitted: 1/30/2020 11:29 AM  Attending Provider: Dr Moises Koo or her authorized caregiver has made the decision for the patient to leave the emergency department against the advice of the [facility] department staff  She or her authorized caregiver has been informed and understands the inherent risks, including death,     She or her authorized caregiver has decided to accept the responsibility for this decision  Darci Cockayne and all [facility] parties have been advised that she may return for further evaluation or treatment  Her condition at time of discharge was poor  Darci Cockayne had current vital signs as follows:  /71 (BP Location: Right arm)   Pulse (!) 107   Temp 98 3  °F (36 8 °C) (Temporal)   Resp 19   Wt 82 9 kg (182 lb 12 2 oz)         Follow-up Information     Follow up With Specialties Details Why Contact Info Additional Information    Ben Contreras MD Internal Medicine In 1 week As needed [facility]  [phone]       [facility] Emergency Department Emergency Medicine  If symptoms worsen [place] [id number] [phone] [place] ED, [address], [address]          Patient's Medications   Discharge Prescriptions    No medications on file     No discharge procedures on file  ED Provider  Attending physically available and evaluated Darci Cockayne  I managed the patient along with the ED Attending      Electronically Signed by         Tj Loera MD  01/30/20 5657

## 2020-01-31 ENCOUNTER — APPOINTMENT (EMERGENCY)
Dept: CT IMAGING | Facility: HOSPITAL | Age: 60
End: 2020-01-31
Payer: COMMERCIAL

## 2020-01-31 ENCOUNTER — HOSPITAL ENCOUNTER (EMERGENCY)
Facility: HOSPITAL | Age: 60
Discharge: HOME | End: 2020-01-31
Attending: FAMILY MEDICINE | Admitting: FAMILY MEDICINE
Payer: COMMERCIAL

## 2020-01-31 VITALS
DIASTOLIC BLOOD PRESSURE: 66 MMHG | TEMPERATURE: 98 F | OXYGEN SATURATION: 95 % | HEIGHT: 67 IN | BODY MASS INDEX: 28.75 KG/M2 | SYSTOLIC BLOOD PRESSURE: 112 MMHG | RESPIRATION RATE: 17 BRPM | WEIGHT: 183.2 LBS | HEART RATE: 89 BPM

## 2020-01-31 DIAGNOSIS — J40 BRONCHITIS: Primary | ICD-10-CM

## 2020-01-31 LAB
ALBUMIN SERPL BCP-MCNC: 3.3 G/DL (ref 3.5–5)
ALP SERPL-CCNC: 89 U/L (ref 46–116)
ALT SERPL W P-5'-P-CCNC: 25 U/L (ref 12–78)
ANION GAP SERPL CALCULATED.3IONS-SCNC: 10 MMOL/L (ref 4–13)
AST SERPL W P-5'-P-CCNC: 18 U/L (ref 5–45)
BASOPHILS # BLD AUTO: 0.03 THOUSANDS/ΜL (ref 0–0.1)
BASOPHILS NFR BLD AUTO: 0 % (ref 0–1)
BILIRUB SERPL-MCNC: 0.3 MG/DL (ref 0.2–1)
BILIRUB UR QL STRIP: NEGATIVE
BUN SERPL-MCNC: 13 MG/DL (ref 5–25)
CALCIUM SERPL-MCNC: 9.1 MG/DL (ref 8.3–10.1)
CHLORIDE SERPL-SCNC: 104 MMOL/L (ref 100–108)
CLARITY UR: CLEAR
CO2 SERPL-SCNC: 24 MMOL/L (ref 21–32)
COLOR UR: YELLOW
CREAT SERPL-MCNC: 1.06 MG/DL (ref 0.6–1.3)
EOSINOPHIL # BLD AUTO: 0.01 THOUSAND/ΜL (ref 0–0.61)
EOSINOPHIL NFR BLD AUTO: 0 % (ref 0–6)
ERYTHROCYTE [DISTWIDTH] IN BLOOD BY AUTOMATED COUNT: 12.5 % (ref 11.6–15.1)
GFR SERPL CREATININE-BSD FRML MDRD: 58 ML/MIN/1.73SQ M
GLUCOSE SERPL-MCNC: 135 MG/DL (ref 65–140)
GLUCOSE UR STRIP-MCNC: NEGATIVE MG/DL
HCT VFR BLD AUTO: 34.4 % (ref 34.8–46.1)
HGB BLD-MCNC: 11.1 G/DL (ref 11.5–15.4)
HGB UR QL STRIP.AUTO: NEGATIVE
IMM GRANULOCYTES # BLD AUTO: 0.06 THOUSAND/UL (ref 0–0.2)
IMM GRANULOCYTES NFR BLD AUTO: 1 % (ref 0–2)
KETONES UR STRIP-MCNC: NEGATIVE MG/DL
LEUKOCYTE ESTERASE UR QL STRIP: NEGATIVE
LIPASE SERPL-CCNC: 100 U/L (ref 73–393)
LYMPHOCYTES # BLD AUTO: 2.6 THOUSANDS/ΜL (ref 0.6–4.47)
LYMPHOCYTES NFR BLD AUTO: 38 % (ref 14–44)
MCH RBC QN AUTO: 30.1 PG (ref 26.8–34.3)
MCHC RBC AUTO-ENTMCNC: 32.3 G/DL (ref 31.4–37.4)
MCV RBC AUTO: 93 FL (ref 82–98)
MONOCYTES # BLD AUTO: 0.34 THOUSAND/ΜL (ref 0.17–1.22)
MONOCYTES NFR BLD AUTO: 5 % (ref 4–12)
NEUTROPHILS # BLD AUTO: 3.86 THOUSANDS/ΜL (ref 1.85–7.62)
NEUTS SEG NFR BLD AUTO: 56 % (ref 43–75)
NITRITE UR QL STRIP: NEGATIVE
NRBC BLD AUTO-RTO: 0 /100 WBCS
PH UR STRIP.AUTO: 7 [PH]
PLATELET # BLD AUTO: 266 THOUSANDS/UL (ref 149–390)
PMV BLD AUTO: 8 FL (ref 8.9–12.7)
POTASSIUM SERPL-SCNC: 3.9 MMOL/L (ref 3.5–5.3)
PROT SERPL-MCNC: 8 G/DL (ref 6.4–8.2)
PROT UR STRIP-MCNC: NEGATIVE MG/DL
RBC # BLD AUTO: 3.69 MILLION/UL (ref 3.81–5.12)
SODIUM SERPL-SCNC: 138 MMOL/L (ref 136–145)
SP GR UR STRIP.AUTO: <=1.005 (ref 1–1.03)
TROPONIN I SERPL-MCNC: <0.02 NG/ML
UROBILINOGEN UR QL STRIP.AUTO: 0.2 E.U./DL
WBC # BLD AUTO: 6.9 THOUSAND/UL (ref 4.31–10.16)

## 2020-01-31 PROCEDURE — 84484 ASSAY OF TROPONIN QUANT: CPT | Performed by: PHYSICIAN ASSISTANT

## 2020-01-31 PROCEDURE — 81003 URINALYSIS AUTO W/O SCOPE: CPT | Performed by: PHYSICIAN ASSISTANT

## 2020-01-31 PROCEDURE — 83690 ASSAY OF LIPASE: CPT | Performed by: PHYSICIAN ASSISTANT

## 2020-01-31 PROCEDURE — 80053 COMPREHEN METABOLIC PANEL: CPT | Performed by: PHYSICIAN ASSISTANT

## 2020-01-31 PROCEDURE — 74177 CT ABD & PELVIS W/CONTRAST: CPT

## 2020-01-31 PROCEDURE — 99284 EMERGENCY DEPT VISIT MOD MDM: CPT | Performed by: PHYSICIAN ASSISTANT

## 2020-01-31 PROCEDURE — 93005 ELECTROCARDIOGRAM TRACING: CPT

## 2020-01-31 PROCEDURE — 99284 EMERGENCY DEPT VISIT MOD MDM: CPT

## 2020-01-31 PROCEDURE — 36415 COLL VENOUS BLD VENIPUNCTURE: CPT | Performed by: PHYSICIAN ASSISTANT

## 2020-01-31 PROCEDURE — 85025 COMPLETE CBC W/AUTO DIFF WBC: CPT | Performed by: PHYSICIAN ASSISTANT

## 2020-01-31 PROCEDURE — 71275 CT ANGIOGRAPHY CHEST: CPT

## 2020-01-31 RX ADMIN — IOHEXOL 100 ML: 350 INJECTION, SOLUTION INTRAVENOUS at 15:28

## 2020-01-31 NOTE — CASE MANAGEMENT
I self referred the patient to discuss resources that might be available to her  She denied needing any help at this time  The patient lives with her  in a three story house  There is 2 [address] and two flights of 14 steps inside the home  She has a cane, nebulizer, and stair climber  She does not receive meals on wheels or home health services at this time  She is independent with her ADL's and she drives  She uses [employer] in [facility]  She has no trouble getting or paying for her medications  She has [employer] insurance  She did not call her PCP prior to coming to the ED today

## 2020-01-31 NOTE — DISCHARGE INSTRUCTIONS
Finished the antibiotic and steroids as prescribed  Schedule follow up with your family doctor or return to ER as needed

## 2020-01-31 NOTE — ED PROVIDER NOTES
History  Chief Complaint   Patient presents with    Abdominal Pain     patient states she was seen here yesterday for shortness of breath and abdominal pain and was supposed to get a PE study but signed out AMA because "she had somewhere else to be" patient states she feels short of breath at times at rest and with activity; patient also states she has left sided constant abdominal pain x2 weeks      61year old female presents for evaluation of abdominal pain  Pt notes she was seen here yesterday for her "lungs" but ultimately signed out AMA as she had someone to be  She had was here related to her breathing and cough/congestion  She was told she needed a test to evaluate for a blood clot but didn't have the CT performed  In regards to her breathing and cough, she feels this has improved with her neb treatments  She is currently taking an antibiotic and steroid as prescribed  She does admit to SOB with exertion but this is improving  Denies chest pain  Also is concerned about a "lump" on the left side of her abdomen  She notes this has been here at least 15 days  No prior evaluation  Reports occasional nausea  Denies vomiting, diarrhea or constipation  Denies any urinary complaints  Denies fever, chills  History provided by:  Patient, medical records and spouse   used: No        Prior to Admission Medications   Prescriptions Last Dose Informant Patient Reported? Taking?    ALPRAZolam (XANAX) 1 mg tablet 1/31/2020 at Unknown time  Yes Yes   Sig: Take by mouth   PARoxetine (PAXIL) 40 MG tablet 1/30/2020 at Unknown time  Yes Yes   Sig: Take 40 mg by mouth daily   QUEtiapine (SEROquel) 400 MG tablet 1/30/2020 at Unknown time  Yes Yes   Sig: Take 800 mg by mouth   albuterol (2 5 mg/3 mL) 0 083 % nebulizer solution 1/30/2020 at Unknown time  No Yes   Sig: Take 1 vial (2 5 mg total) by nebulization every 6 (six) hours as needed for wheezing or shortness of breath for up to 30 doses albuterol (PROAIR HFA) 90 mcg/act inhaler 1/30/2020 at Unknown time  Yes Yes   Sig: Inhale   aspirin (ECOTRIN LOW STRENGTH) 81 mg EC tablet   Yes Yes   Sig: Take 81 mg by mouth daily   azithromycin (ZITHROMAX) 250 mg tablet Not Taking at Unknown time  No No   Sig: Take 2 tablets today then 1 tablet daily x 4 days   Patient not taking: Reported on 1/31/2020   methylPREDNISolone 4 MG tablet therapy pack Unknown at Unknown time  No No   Sig: Use as directed on package   omeprazole (PRILOSEC OTC) 20 MG tablet 1/30/2020 at Unknown time  Yes Yes   Sig: Take 20 mg by mouth daily   tiotropium (SPIRIVA) 18 mcg inhalation capsule 1/31/2020 at Unknown time  Yes Yes   Sig: Place 18 mcg into inhaler and inhale daily      Facility-Administered Medications: None       Past Medical History:   Diagnosis Date    Avascular necrosis ([place] [address] )     Steward's esophagus     Cancer ([place] [address] )     COPD (chronic obstructive pulmonary disease) ([place] [address] )     Factor 5 Leiden mutation, heterozygous ([place] [address] )     Hyperlipidemia        Past Surgical History:   Procedure Laterality Date    CHOLECYSTECTOMY      TONSILLECTOMY         History reviewed  No pertinent family history  I have reviewed and agree with the history as documented  Social History     Tobacco Use    Smoking status: Current Every Day Smoker     Packs/day: 0 50     Types: Cigarettes    Smokeless tobacco: Never Used   Substance Use Topics    Alcohol use: Not Currently    Drug use: Not Currently        Review of Systems   Constitutional: Negative  Negative for chills, fatigue and fever  HENT: Positive for congestion and rhinorrhea  Negative for sore throat  Eyes: Negative  Negative for visual disturbance  Respiratory: Positive for cough and shortness of breath (w/ exertion)  Negative for wheezing  Cardiovascular: Negative  Negative for chest pain, palpitations and leg swelling  Gastrointestinal: Positive for abdominal pain and nausea   Negative for constipation, diarrhea and vomiting  Genitourinary: Negative  Negative for dysuria, flank pain, frequency and hematuria  Musculoskeletal: Negative  Negative for back pain and myalgias  Skin: Negative  Negative for rash  Neurological: Negative  Negative for dizziness, light-headedness and headaches  Psychiatric/Behavioral: Negative  Negative for confusion  All other systems reviewed and are negative  Physical Exam  Physical Exam   Constitutional: She is oriented to person, place, and time  She appears well-developed and well-nourished  No distress  HENT:   Head: Normocephalic and atraumatic  Right Ear: Hearing, tympanic membrane, external ear and ear canal normal    Left Ear: Hearing, tympanic membrane, external ear and ear canal normal    Nose: Nose normal    Mouth/Throat: Uvula is midline, oropharynx is clear and moist and mucous membranes are normal  No oropharyngeal exudate  Eyes: Pupils are equal, round, and reactive to light  Conjunctivae and EOM are normal  No scleral icterus  Neck: Trachea normal and normal range of motion  Neck supple  No tracheal deviation present  Cardiovascular: Normal rate, regular rhythm, normal heart sounds and normal pulses  No murmur heard  Pulmonary/Chest: Effort normal  No tachypnea  No respiratory distress  She has wheezes (scattered exp wheezes at bases b/l)  She has no rhonchi  She has no rales  Abdominal: Soft  Bowel sounds are normal  She exhibits no distension  There is no hepatosplenomegaly  There is tenderness in the left upper quadrant  There is no rigidity, no rebound, no guarding and no CVA tenderness  No hernia  I do not appreciate any palpable lumps in area of pt's concern  Musculoskeletal: Normal range of motion  She exhibits no edema or tenderness  Neurological: She is alert and oriented to person, place, and time  She has normal reflexes  No cranial nerve deficit or sensory deficit  She exhibits normal muscle tone   Coordination and gait normal  GCS eye subscore is 4  GCS verbal subscore is 5  GCS motor subscore is 6  Skin: Skin is warm and dry  Capillary refill takes less than 2 seconds  No rash noted  No cyanosis  Psychiatric: She has a normal mood and affect  Her speech is normal and behavior is normal    Nursing note and vitals reviewed        Vital Signs  ED Triage Vitals   Temperature Pulse Respirations Blood Pressure SpO2   01/31/20 1410 01/31/20 1410 01/31/20 1410 01/31/20 1410 01/31/20 1410   98 °F (36 7 °C) 92 16 125/73 98 %      Temp src Heart Rate Source Patient Position - Orthostatic VS BP Location FiO2 (%)   -- 01/31/20 1410 01/31/20 1430 01/31/20 1410 --    Monitor Lying Right arm       Pain Score       01/31/20 1410       7           Vitals:    01/31/20 1410 01/31/20 1430 01/31/20 1600 01/31/20 1651   BP: 125/73 136/81 104/74 112/66   Pulse: 92 93 85 89   Patient Position - Orthostatic VS:  Lying Sitting          Visual Acuity      ED Medications  Medications   iohexol (OMNIPAQUE) 350 MG/ML injection (SINGLE-DOSE) 100 mL (100 mL Intravenous Given 1/31/20 1528)       Diagnostic Studies  Results Reviewed     Procedure Component Value Units Date/Time    UA (URINE) with reflex to Scope [[id number]] Collected:  01/31/20 1631    Lab Status:  Final result Specimen:  Urine, Clean Catch Updated:  01/31/20 1636     Color, UA Yellow     Clarity, UA Clear     Specific Gravity, UA <=1 005     pH, UA 7 0     Leukocytes, UA Negative     Nitrite, UA Negative     Protein, UA Negative mg/dl      Glucose, UA Negative mg/dl      Ketones, UA Negative mg/dl      Urobilinogen, UA 0 2 E U /dl      Bilirubin, UA Negative     Blood, UA Negative    Troponin I [[id number]]  (Normal) Collected:  01/31/20 1453    Lab Status:  Final result Specimen:  Blood from Arm, Left Updated:  01/31/20 1521     Troponin I <0 02 ng/mL     Comprehensive metabolic panel [[id number]]  (Abnormal) Collected:  01/31/20 1453    Lab Status:  Final result Specimen:  Blood from Arm, Left Updated:  01/31/20 1517     Sodium 138 mmol/L      Potassium 3 9 mmol/L      Chloride 104 mmol/L      CO2 24 mmol/L      ANION GAP 10 mmol/L      BUN 13 mg/dL      Creatinine 1 06 mg/dL      Glucose 135 mg/dL      Calcium 9 1 mg/dL      AST 18 U/L      ALT 25 U/L      Alkaline Phosphatase 89 U/L      Total Protein 8 0 g/dL      Albumin 3 3 g/dL      Total Bilirubin 0 30 mg/dL      eGFR 58 ml/min/1 73sq m     Narrative:       [place] guidelines for Chronic Kidney Disease (CKD):     Stage 1 with normal or high GFR (GFR > 90 mL/min/1 73 square meters)    Stage 2 Mild CKD (GFR = 60-89 mL/min/1 73 square meters)    Stage 3A Moderate CKD (GFR = 45-59 mL/min/1 73 square meters)    Stage 3B Moderate CKD (GFR = 30-44 mL/min/1 73 square meters)    Stage 4 Severe CKD (GFR = 15-29 mL/min/1 73 square meters)    Stage 5 End Stage CKD (GFR <15 mL/min/1 73 square meters)  Note: GFR calculation is accurate only with a steady state creatinine    Lipase [[id number]]  (Normal) Collected:  01/31/20 1453    Lab Status:  Final result Specimen:  Blood from Arm, Left Updated:  01/31/20 1512     Lipase 100 u/L     CBC and differential [[id number]]  (Abnormal) Collected:  01/31/20 1453    Lab Status:  Final result Specimen:  Blood from Arm, Left Updated:  01/31/20 1502     WBC 6 90 Thousand/uL      RBC 3 69 Million/uL      Hemoglobin 11 1 g/dL      Hematocrit 34 4 %      MCV 93 fL      MCH 30 1 pg      MCHC 32 3 g/dL      RDW 12 5 %      MPV 8 0 fL      Platelets 972 Thousands/uL      nRBC 0 /100 WBCs      Neutrophils Relative 56 %      Immat GRANS % 1 %      Lymphocytes Relative 38 %      Monocytes Relative 5 %      Eosinophils Relative 0 %      Basophils Relative 0 %      Neutrophils Absolute 3 86 Thousands/µL      Immature Grans Absolute 0 06 Thousand/uL      Lymphocytes Absolute 2 60 Thousands/µL      Monocytes Absolute 0 34 Thousand/µL      Eosinophils Absolute 0 01 Thousand/µL      Basophils Absolute 0 03 Thousands/µL                  CT pe study w abdomen pelvis w contrast   Final Result by Norma Glass MD (01/31 4571)      There is no evidence of pulmonary embolism  There are tree-in-bud opacities in the right middle lobe, right lower lobe, and lingula, with associated mild bronchiectasis consistent with bronchiolitis  No acute pathology in the abdomen and pelvis  Workstation performed: [id number]                    Procedures  ECG 12 Lead Documentation Only  Date/Time: 1/31/2020 4:16 PM  Performed by: Aldo Sim PA-C  Authorized by: Aldo Sim PA-C     Indications / Diagnosis:  Dyspnea  ECG reviewed by me, the ED Provider: yes    Patient location:  ED  Previous ECG:     Previous ECG:  Unavailable    Comparison to cardiac monitor: Yes    Interpretation:     Interpretation: normal    Rate:     ECG rate:  85    ECG rate assessment: normal    Rhythm:     Rhythm: sinus rhythm    Ectopy:     Ectopy: none    QRS:     QRS axis:  Normal    QRS intervals:  Normal  Conduction:     Conduction: normal    ST segments:     ST segments:  Normal  T waves:     T waves: non-specific    Comments:      , QRS 88, QT/QTc 368/437; no acute ischemic changes  Low voltage QRS  ED Course  ED Course as of Jan 31 1717 Fri Jan 31, 2020   1446 Previous records from yesterday reviewed  Pt had elevated D dimer but signed out AMA prior to having CTA chest performed  Given pt's abdominal concern, will repeat labs and obtain CT PE study with abd/pelv       1502 WBC: 6 90   1502 Stable from 11 0 yesterday     Hemoglobin(!): 11 1   1502 Platelet Count: 614   1513 Lipase: 100   1524 Glucose, Random: 135   1524 Creatinine: 1 06   1524 BUN: 13   1524 Sodium: 138   1524 Potassium: 3 9   1524 Chloride: 104   1524 CO2: 24   1524 Anion Gap: 10   1524 Calcium: 9 1   1524 AST: 18   1524 ALT: 25   1524 Alkaline Phosphatase: 89   1524 Total Protein: 8 0   1524 Albumin(!): 3 3   1524 TOTAL BILIRUBIN: 0 30   1524 eGFR: 58   1524 Troponin I: <0 02   1602 IMPRESSION:     There is no evidence of pulmonary embolism      There are tree-in-bud opacities in the right middle lobe, right lower lobe, and lingula, with associated mild bronchiectasis consistent with bronchiolitis      No acute pathology in the abdomen and pelvis  CT pe study w abdomen pelvis w contrast   1636 Color, UA: Yellow   1636 Clarity, UA: Clear   1636 SL AMB SPECIFIC GRAVITY_URINE: <=1 005   1636 pH, UA: 7 0   1636 Leukocytes, UA: Negative   1636 Nitrite, UA: Negative   1636 POCT URINE PROTEIN: Negative   1636 Glucose, UA: Negative   1636 Ketones, UA: Negative   1637 SL AMB POCT UROBILINOGEN: 0 2   1637 Bilirubin, UA: Negative   1637 Blood, UA: Negative   1637 Findings reviewed with pt and spouse  CT shows findings compatible with bronchiolitis  Abd/pelv CT shows no acute findings  Abdomen remains soft, nontender  Again I do not elicit any palpable abnormalities in area of pt's concern  She offers no complaints  No further respiratory complaints and she feels her cough is improving  She was given steroids and antibiotic for bronchitis yesterday which she has been taking as prescribed  Discussed continued symptomatic and supportive care  Strict return precautions outlined  Advised to finish antibiotic and steroid as prescribed for the full duration  Continue albuterol inhaler as needed  Smoking cessation advised  Advised outpatient follow up with PCP or return to ER for change in condition as outlined  Pt and spouse verbalized understanding and had no further questions  Pt left in stable, improved condition        HEART Risk Score      Most Recent Value   History  0 Filed at: 01/31/2020 1616   ECG  0 Filed at: 01/31/2020 1616   Age  1 Filed at: 01/31/2020 1616   Risk Factors  1 Filed at: 01/31/2020 1616   Troponin  0 Filed at: 01/31/2020 1616   Heart Score Risk Calculator   History  0 Filed at: 01/31/2020 1616   ECG  0 Filed at: 01/31/2020 1616   Age  1 Filed at: 01/31/2020 1616   Risk Factors  1 Filed at: 01/31/2020 1616   Troponin  0 Filed at: 01/31/2020 1616   HEART Score  2 Filed at: 01/31/2020 1616   HEART Score  2 Filed at: 01/31/2020 1225 Goldie Vergara' Criteria for PE      Most Recent Value   Alex' Criteria for PE   Clinical signs and symptoms of DVT  0 Filed at: 01/31/2020 1616   PE is primary diagnosis or equally likely  3 Filed at: 01/31/2020 1616   HR >100  0 Filed at: 01/31/2020 1616   Immobilization at least 3 days or Surgery in the previous 4 weeks  0 Filed at: 01/31/2020 1616   Previous, objectively diagnosed PE or DVT  0 Filed at: 01/31/2020 1616   Hemoptysis  0 Filed at: 01/31/2020 1616   Malignancy with treatment within 6 months or palliative  0 Filed at: 01/31/2020 1616   Wells' Criteria Total  3 Filed at: 01/31/2020 1616            [facility]  Number of Diagnoses or Management Options  Bronchitis: new and requires workup     Amount and/or Complexity of Data Reviewed  Clinical lab tests: ordered and reviewed  Tests in the radiology section of CPT®: ordered and reviewed  Decide to obtain previous medical records or to obtain history from someone other than the patient: yes  Obtain history from someone other than the patient: yes  Review and summarize past medical records: yes  Independent visualization of images, tracings, or specimens: yes    Patient Progress  Patient progress: improved        Disposition  Final diagnoses:   Bronchitis     Time reflects when diagnosis was documented in both MDM as applicable and the Disposition within this note     Time User Action Codes Description Comment    1/31/2020  4:40 PM [address] Bronchitis       ED Disposition     ED Disposition Condition Date/Time Comment    Discharge Stable Fri Jan 31, 2020  4:40 PM Tray Buckley discharge to home/self care              Follow-up Information     Follow up With Specialties Details Why Contact Info Additional Information    Maggi Loaiza MD Internal Medicine Schedule an appointment as soon as possible for a visit   [address] [facility] [address]  [phone]       [facility] Emergency Department Emergency Medicine  As needed [address] [facility] [place], [place], [address]          Discharge Medication List as of 1/31/2020  4:44 PM      CONTINUE these medications which have NOT CHANGED    Details   albuterol (2 5 mg/3 mL) 0 083 % nebulizer solution Take 1 vial (2 5 mg total) by nebulization every 6 (six) hours as needed for wheezing or shortness of breath for up to 30 doses, Starting Thu 1/30/2020, Normal      albuterol (PROAIR HFA) 90 mcg/act inhaler Inhale, Starting Mon 11/4/2013, Historical Med      ALPRAZolam (XANAX) 1 mg tablet Take by mouth, Starting Fri 4/8/2011, Historical Med      aspirin (ECOTRIN LOW STRENGTH) 81 mg EC tablet Take 81 mg by mouth daily, Historical Med      omeprazole (PRILOSEC OTC) 20 MG tablet Take 20 mg by mouth daily, Starting Wed 5/29/2019, Historical Med      PARoxetine (PAXIL) 40 MG tablet Take 40 mg by mouth daily, Starting Tue 5/14/2019, Historical Med      QUEtiapine (SEROquel) 400 MG tablet Take 800 mg by mouth, Starting Mon 6/3/2019, Historical Med      tiotropium (SPIRIVA) 18 mcg inhalation capsule Place 18 mcg into inhaler and inhale daily, Historical Med      azithromycin (ZITHROMAX) 250 mg tablet Take 2 tablets today then 1 tablet daily x 4 days, Normal      methylPREDNISolone 4 MG tablet therapy pack Use as directed on package, Normal           No discharge procedures on file      ED Provider  Electronically Signed by           Jamal Scott PA-C  01/31/20 [id number]

## 2020-02-02 LAB
ATRIAL RATE: 85 BPM
P AXIS: 31 DEGREES
PR INTERVAL: 152 MS
QRS AXIS: 7 DEGREES
QRSD INTERVAL: 88 MS
QT INTERVAL: 368 MS
QTC INTERVAL: 437 MS
T WAVE AXIS: 30 DEGREES
VENTRICULAR RATE: 85 BPM

## 2020-02-02 PROCEDURE — 93010 ELECTROCARDIOGRAM REPORT: CPT | Performed by: INTERNAL MEDICINE

## 2020-02-25 ENCOUNTER — HOSPITAL ENCOUNTER (OUTPATIENT)
Dept: RADIOLOGY | Facility: HOSPITAL | Age: 60
Discharge: HOME | End: 2020-02-25
Payer: COMMERCIAL

## 2020-02-25 ENCOUNTER — TRANSCRIBE ORDERS (OUTPATIENT)
Dept: ADMINISTRATIVE | Facility: HOSPITAL | Age: 60
End: 2020-02-25

## 2020-02-25 DIAGNOSIS — M54.6 PAIN IN THORACIC SPINE: ICD-10-CM

## 2020-02-25 DIAGNOSIS — G89.29 CHRONIC IDIOPATHIC ANAL PAIN: ICD-10-CM

## 2020-02-25 DIAGNOSIS — M54.42 ACUTE BACK PAIN WITH SCIATICA, LEFT: ICD-10-CM

## 2020-02-25 DIAGNOSIS — K62.89 CHRONIC IDIOPATHIC ANAL PAIN: ICD-10-CM

## 2020-02-25 DIAGNOSIS — M54.41 ACUTE BACK PAIN WITH SCIATICA, RIGHT: ICD-10-CM

## 2020-02-25 DIAGNOSIS — M54.42 ACUTE BACK PAIN WITH SCIATICA, LEFT: Primary | ICD-10-CM

## 2020-02-25 DIAGNOSIS — M54.2 CERVICALGIA: ICD-10-CM

## 2020-02-25 PROCEDURE — 72050 X-RAY EXAM NECK SPINE 4/5VWS: CPT

## 2020-02-25 PROCEDURE — 72070 X-RAY EXAM THORAC SPINE 2VWS: CPT

## 2020-02-25 PROCEDURE — 72110 X-RAY EXAM L-2 SPINE 4/>VWS: CPT

## 2020-05-24 ENCOUNTER — HOSPITAL ENCOUNTER (EMERGENCY)
Facility: HOSPITAL | Age: 60
Discharge: HOME | End: 2020-05-24
Attending: EMERGENCY MEDICINE | Admitting: EMERGENCY MEDICINE
Payer: COMMERCIAL

## 2020-05-24 ENCOUNTER — APPOINTMENT (EMERGENCY)
Dept: RADIOLOGY | Facility: HOSPITAL | Age: 60
End: 2020-05-24
Payer: COMMERCIAL

## 2020-05-24 VITALS
HEART RATE: 90 BPM | TEMPERATURE: 97.9 F | BODY MASS INDEX: 31.38 KG/M2 | RESPIRATION RATE: 16 BRPM | SYSTOLIC BLOOD PRESSURE: 128 MMHG | DIASTOLIC BLOOD PRESSURE: 62 MMHG | HEIGHT: 67 IN | OXYGEN SATURATION: 94 % | WEIGHT: 199.96 LBS

## 2020-05-24 DIAGNOSIS — M25.562 LEFT KNEE PAIN: Primary | ICD-10-CM

## 2020-05-24 DIAGNOSIS — S80.02XA CONTUSION OF LEFT KNEE: ICD-10-CM

## 2020-05-24 PROCEDURE — 99283 EMERGENCY DEPT VISIT LOW MDM: CPT

## 2020-05-24 PROCEDURE — 99284 EMERGENCY DEPT VISIT MOD MDM: CPT | Performed by: PHYSICIAN ASSISTANT

## 2020-05-24 PROCEDURE — 73564 X-RAY EXAM KNEE 4 OR MORE: CPT

## 2020-05-24 RX ORDER — CLONAZEPAM 1 MG/1
1 TABLET ORAL 3 TIMES DAILY
COMMUNITY

## 2020-05-24 RX ORDER — PREDNISONE 50 MG/1
50 TABLET ORAL DAILY
Qty: 4 TABLET | Refills: 0 | Status: SHIPPED | OUTPATIENT
Start: 2020-05-24

## 2020-12-26 ENCOUNTER — HOSPITAL ENCOUNTER (EMERGENCY)
Facility: HOSPITAL | Age: 60
Discharge: HOME | End: 2020-12-26
Attending: EMERGENCY MEDICINE
Payer: COMMERCIAL

## 2020-12-26 ENCOUNTER — APPOINTMENT (EMERGENCY)
Dept: RADIOLOGY | Facility: HOSPITAL | Age: 60
End: 2020-12-26
Payer: COMMERCIAL

## 2020-12-26 VITALS
HEART RATE: 90 BPM | HEIGHT: 67 IN | TEMPERATURE: 97 F | SYSTOLIC BLOOD PRESSURE: 113 MMHG | BODY MASS INDEX: 30.73 KG/M2 | RESPIRATION RATE: 20 BRPM | DIASTOLIC BLOOD PRESSURE: 69 MMHG | OXYGEN SATURATION: 94 % | WEIGHT: 195.77 LBS

## 2020-12-26 DIAGNOSIS — L03.90 CELLULITIS: ICD-10-CM

## 2020-12-26 DIAGNOSIS — S92.919A TOE FRACTURE: Primary | ICD-10-CM

## 2020-12-26 PROCEDURE — 99283 EMERGENCY DEPT VISIT LOW MDM: CPT

## 2020-12-26 PROCEDURE — 99284 EMERGENCY DEPT VISIT MOD MDM: CPT | Performed by: EMERGENCY MEDICINE

## 2020-12-26 PROCEDURE — 73630 X-RAY EXAM OF FOOT: CPT

## 2020-12-26 RX ORDER — CEPHALEXIN 250 MG/1
500 CAPSULE ORAL ONCE
Status: COMPLETED | OUTPATIENT
Start: 2020-12-26 | End: 2020-12-26

## 2020-12-26 RX ORDER — CEPHALEXIN 500 MG/1
500 CAPSULE ORAL 4 TIMES DAILY
Qty: 20 CAPSULE | Refills: 0 | Status: SHIPPED | OUTPATIENT
Start: 2020-12-26 | End: 2020-12-31

## 2020-12-26 RX ORDER — GINSENG 100 MG
1 CAPSULE ORAL ONCE
Status: COMPLETED | OUTPATIENT
Start: 2020-12-26 | End: 2020-12-26

## 2020-12-26 RX ADMIN — BACITRACIN 1 SMALL APPLICATION: 500 OINTMENT TOPICAL at 14:45

## 2020-12-26 RX ADMIN — CEPHALEXIN 500 MG: 250 CAPSULE ORAL at 14:44

## 2022-02-16 ENCOUNTER — TELEPHONE (OUTPATIENT)
Dept: PSYCHIATRY | Facility: CLINIC | Age: 62
End: 2022-02-16

## 2022-05-04 ENCOUNTER — HOSPITAL ENCOUNTER (EMERGENCY)
Facility: HOSPITAL | Age: 62
Discharge: HOME | End: 2022-05-05
Attending: EMERGENCY MEDICINE | Admitting: EMERGENCY MEDICINE
Payer: COMMERCIAL

## 2022-05-04 ENCOUNTER — APPOINTMENT (EMERGENCY)
Dept: CT IMAGING | Facility: HOSPITAL | Age: 62
End: 2022-05-04
Payer: COMMERCIAL

## 2022-05-04 ENCOUNTER — APPOINTMENT (EMERGENCY)
Dept: RADIOLOGY | Facility: HOSPITAL | Age: 62
End: 2022-05-04
Payer: COMMERCIAL

## 2022-05-04 DIAGNOSIS — M54.9 BACK PAIN: Primary | ICD-10-CM

## 2022-05-04 LAB
2HR DELTA HS TROPONIN: <0 NG/L
ALBUMIN SERPL BCP-MCNC: 3.8 G/DL (ref 3.5–5)
ALP SERPL-CCNC: 117 U/L (ref 46–116)
ALT SERPL W P-5'-P-CCNC: 41 U/L (ref 12–78)
ANION GAP SERPL CALCULATED.3IONS-SCNC: 10 MMOL/L (ref 4–13)
APTT PPP: 39 SECONDS (ref 23–37)
AST SERPL W P-5'-P-CCNC: 23 U/L (ref 5–45)
BASOPHILS # BLD AUTO: 0.03 THOUSANDS/ΜL (ref 0–0.1)
BASOPHILS NFR BLD AUTO: 0 % (ref 0–1)
BILIRUB SERPL-MCNC: 0.43 MG/DL (ref 0.2–1)
BUN SERPL-MCNC: 16 MG/DL (ref 5–25)
CALCIUM SERPL-MCNC: 8.5 MG/DL (ref 8.3–10.1)
CARDIAC TROPONIN I PNL SERPL HS: 2 NG/L
CARDIAC TROPONIN I PNL SERPL HS: <2 NG/L
CHLORIDE SERPL-SCNC: 102 MMOL/L (ref 100–108)
CO2 SERPL-SCNC: 24 MMOL/L (ref 21–32)
CREAT SERPL-MCNC: 1.53 MG/DL (ref 0.6–1.3)
EOSINOPHIL # BLD AUTO: 0 THOUSAND/ΜL (ref 0–0.61)
EOSINOPHIL NFR BLD AUTO: 0 % (ref 0–6)
ERYTHROCYTE [DISTWIDTH] IN BLOOD BY AUTOMATED COUNT: 13.6 % (ref 11.6–15.1)
GFR SERPL CREATININE-BSD FRML MDRD: 36 ML/MIN/1.73SQ M
GLUCOSE SERPL-MCNC: 134 MG/DL (ref 65–140)
HCT VFR BLD AUTO: 36.9 % (ref 34.8–46.1)
HGB BLD-MCNC: 12.4 G/DL (ref 11.5–15.4)
IMM GRANULOCYTES # BLD AUTO: 0.03 THOUSAND/UL (ref 0–0.2)
IMM GRANULOCYTES NFR BLD AUTO: 0 % (ref 0–2)
INR PPP: 1.13 (ref 0.84–1.19)
LACTATE SERPL-SCNC: 0.9 MMOL/L (ref 0.5–2)
LYMPHOCYTES # BLD AUTO: 2.42 THOUSANDS/ΜL (ref 0.6–4.47)
LYMPHOCYTES NFR BLD AUTO: 28 % (ref 14–44)
MAGNESIUM SERPL-MCNC: 2.3 MG/DL (ref 1.6–2.6)
MCH RBC QN AUTO: 31.4 PG (ref 26.8–34.3)
MCHC RBC AUTO-ENTMCNC: 33.6 G/DL (ref 31.4–37.4)
MCV RBC AUTO: 93 FL (ref 82–98)
MONOCYTES # BLD AUTO: 0.37 THOUSAND/ΜL (ref 0.17–1.22)
MONOCYTES NFR BLD AUTO: 4 % (ref 4–12)
NEUTROPHILS # BLD AUTO: 5.93 THOUSANDS/ΜL (ref 1.85–7.62)
NEUTS SEG NFR BLD AUTO: 68 % (ref 43–75)
NRBC BLD AUTO-RTO: 0 /100 WBCS
PLATELET # BLD AUTO: 163 THOUSANDS/UL (ref 149–390)
PMV BLD AUTO: 8.3 FL (ref 8.9–12.7)
POTASSIUM SERPL-SCNC: 3.8 MMOL/L (ref 3.5–5.3)
PROT SERPL-MCNC: 7.8 G/DL (ref 6.4–8.2)
PROTHROMBIN TIME: 13.9 SECONDS (ref 11.6–14.5)
RBC # BLD AUTO: 3.95 MILLION/UL (ref 3.81–5.12)
SODIUM SERPL-SCNC: 136 MMOL/L (ref 136–145)
WBC # BLD AUTO: 8.78 THOUSAND/UL (ref 4.31–10.16)

## 2022-05-04 PROCEDURE — 74177 CT ABD & PELVIS W/CONTRAST: CPT

## 2022-05-04 PROCEDURE — 80053 COMPREHEN METABOLIC PANEL: CPT | Performed by: EMERGENCY MEDICINE

## 2022-05-04 PROCEDURE — 85025 COMPLETE CBC W/AUTO DIFF WBC: CPT | Performed by: EMERGENCY MEDICINE

## 2022-05-04 PROCEDURE — 85730 THROMBOPLASTIN TIME PARTIAL: CPT | Performed by: EMERGENCY MEDICINE

## 2022-05-04 PROCEDURE — 70450 CT HEAD/BRAIN W/O DYE: CPT

## 2022-05-04 PROCEDURE — 96360 HYDRATION IV INFUSION INIT: CPT

## 2022-05-04 PROCEDURE — 99284 EMERGENCY DEPT VISIT MOD MDM: CPT

## 2022-05-04 PROCEDURE — 83605 ASSAY OF LACTIC ACID: CPT | Performed by: EMERGENCY MEDICINE

## 2022-05-04 PROCEDURE — 83735 ASSAY OF MAGNESIUM: CPT | Performed by: EMERGENCY MEDICINE

## 2022-05-04 PROCEDURE — 93005 ELECTROCARDIOGRAM TRACING: CPT

## 2022-05-04 PROCEDURE — 71260 CT THORAX DX C+: CPT

## 2022-05-04 PROCEDURE — G1004 CDSM NDSC: HCPCS

## 2022-05-04 PROCEDURE — 71045 X-RAY EXAM CHEST 1 VIEW: CPT

## 2022-05-04 PROCEDURE — 36415 COLL VENOUS BLD VENIPUNCTURE: CPT | Performed by: EMERGENCY MEDICINE

## 2022-05-04 PROCEDURE — 84484 ASSAY OF TROPONIN QUANT: CPT | Performed by: EMERGENCY MEDICINE

## 2022-05-04 PROCEDURE — 85610 PROTHROMBIN TIME: CPT | Performed by: EMERGENCY MEDICINE

## 2022-05-04 RX ADMIN — IOHEXOL 100 ML: 350 INJECTION, SOLUTION INTRAVENOUS at 21:19

## 2022-05-04 RX ADMIN — SODIUM CHLORIDE 1000 ML: 0.9 INJECTION, SOLUTION INTRAVENOUS at 22:03

## 2022-05-05 VITALS
HEART RATE: 85 BPM | TEMPERATURE: 97.6 F | WEIGHT: 193.12 LBS | DIASTOLIC BLOOD PRESSURE: 84 MMHG | SYSTOLIC BLOOD PRESSURE: 122 MMHG | RESPIRATION RATE: 18 BRPM | BODY MASS INDEX: 30.31 KG/M2 | HEIGHT: 67 IN | OXYGEN SATURATION: 97 %

## 2022-05-05 LAB
ATRIAL RATE: 87 BPM
P AXIS: 28 DEGREES
PR INTERVAL: 172 MS
QRS AXIS: 4 DEGREES
QRSD INTERVAL: 102 MS
QT INTERVAL: 390 MS
QTC INTERVAL: 469 MS
T WAVE AXIS: 39 DEGREES
VENTRICULAR RATE: 87 BPM

## 2022-05-05 PROCEDURE — 99285 EMERGENCY DEPT VISIT HI MDM: CPT | Performed by: EMERGENCY MEDICINE

## 2022-05-05 PROCEDURE — 93010 ELECTROCARDIOGRAM REPORT: CPT | Performed by: INTERNAL MEDICINE

## 2022-05-05 NOTE — ED PROVIDER NOTES
Emergency Department Trauma Note  Margi Cintron 64 y o  female MRN: [id number]  Unit/Bed#: UZ24/LN77 Encounter: [id number]      Trauma Alert: Trauma Acuity: Trauma Evaluation  Model of Arrival:   via    Trauma Team: Current Providers  Attending Provider: Raj Lindsey MD  Registered Nurse: Latesha Wilkins, RN  Registered Nurse: Annemarie Lyon RN  Consultants:     None      History of Present Illness     Chief Complaint:   Chief Complaint   Patient presents with   Kosta Goodwin     was tripped by her dog  and fell denies head strike  c/o pain in back  no thinners     HPI:  Margi Cintron is a 64 y o  female who presents after a fall  Patient states that she fell to the ground and that her dog ran off  She was unable to then chased after it  A bystander then contacted EMS to bring her in for further evaluation  Patient denies any head strike or loss of consciousness  She denies any nausea or vomiting  Patient states that she has generalized back pain so she thinks she must have fallen onto her bottom or possibly her back  Denies any chest or abdominal pain  No fever chills  No use of blood thinners     Mechanism:Details of Incident: was tripped by her dog  and fell denies head strike  c/o pain in back  no thinners Injury Date: 05/04/22 Injury Time: 2019 Injury Occurence Location - [address] [place] [address] Way: [place]     HPI  Review of Systems   Constitutional: Negative for chills, diaphoresis, fatigue and fever  Respiratory: Negative for cough and shortness of breath  Cardiovascular: Negative for chest pain and palpitations  Gastrointestinal: Negative for abdominal distention, abdominal pain, constipation, diarrhea, nausea and vomiting  Genitourinary: Negative for dysuria, frequency and hematuria  Musculoskeletal: Positive for back pain  Negative for arthralgias, myalgias and neck pain  Neurological: Negative for dizziness, syncope, light-headedness and headaches     All other systems reviewed and are negative  Historical Information     Immunizations:   Immunization History   Administered Date(s) Administered    COVID-19 MODERNA VACC 0 5 ML IM 03/17/2021, 04/14/2021       Past Medical History:   Diagnosis Date    [facility] necrosis ([facility] )     Steward's esophagus     Cancer ([facility] )     COPD (chronic obstructive pulmonary disease) (HCC)     Factor 5 Leiden mutation, heterozygous ([place] [address] )     Hyperlipidemia      History reviewed  No pertinent family history  Past Surgical History:   Procedure Laterality Date    CHOLECYSTECTOMY      TONSILLECTOMY       Social History     Tobacco Use    Smoking status: Current Every Day Smoker     Packs/day: 0 50     Types: Cigarettes    Smokeless tobacco: Never Used   Vaping Use    Vaping Use: Never used   Substance Use Topics    Alcohol use: Not Currently    Drug use: Not Currently     E-Cigarette/Vaping    E-Cigarette Use Never User      E-Cigarette/Vaping Substances       Family History: non-contributory    Meds/Allergies   Prior to Admission Medications   Prescriptions Last Dose Informant Patient Reported? Taking?    ALPRAZolam (XANAX) 1 mg tablet   Yes No   Sig: Take by mouth   PARoxetine (PAXIL) 40 MG tablet   Yes No   Sig: Take 40 mg by mouth daily   QUEtiapine (SEROquel) 400 MG tablet   Yes No   Sig: Take 800 mg by mouth   albuterol (2 5 mg/3 mL) 0 083 % nebulizer solution   No No   Sig: Take 1 vial (2 5 mg total) by nebulization every 6 (six) hours as needed for wheezing or shortness of breath for up to 30 doses   albuterol (PROAIR HFA) 90 mcg/act inhaler   Yes No   Sig: Inhale   aspirin (ECOTRIN LOW STRENGTH) 81 mg EC tablet   Yes No   Sig: Take 81 mg by mouth daily   clonazePAM (KlonoPIN) 1 mg tablet   Yes No   Sig: Take 1 mg by mouth 3 (three) times a day   methylPREDNISolone 4 MG tablet therapy pack   No No   Sig: Use as directed on package   Patient not taking: Reported on 5/24/2020   omeprazole (PRILOSEC OTC) 20 MG tablet   Yes No   Sig: Take 20 mg by mouth daily   predniSONE 50 mg tablet   No No   Sig: Take 1 tablet (50 mg total) by mouth daily   tiotropium (SPIRIVA) 18 mcg inhalation capsule   Yes No   Sig: Place 18 mcg into inhaler and inhale daily      Facility-Administered Medications: None       No Known Allergies    PHYSICAL EXAM    PE limited by: none    Objective   Vitals:   First set: Temperature: 97 6 °F (36 4 °C) (05/04/22 2034)  Pulse: 85 (05/04/22 0014)  Respirations: 18 (05/04/22 0014)  Blood Pressure: 122/84 (05/04/22 0014)  SpO2: 97 % (05/04/22 0014)    Primary Survey:   (A) Airway:  Patent  (B) Breathing:  Equal chest rise  (C) Circulation: Pulses:   normal  (D) Disabliity:  GCS Total:  15  (E) Expose:  Completed    Secondary Survey: (Click on Physical Exam tab above)  Physical Exam  Vitals reviewed  Constitutional:       General: She is not in acute distress  Appearance: She is not diaphoretic  HENT:      Head: Normocephalic and atraumatic  Right Ear: External ear normal       Left Ear: External ear normal       Mouth/Throat:      Mouth: Mucous membranes are dry  Eyes:      General: No scleral icterus  Right eye: No discharge  Left eye: No discharge  Conjunctiva/sclera: Conjunctivae normal       Pupils: Pupils are equal, round, and reactive to light  Neck:      Vascular: No JVD  Cardiovascular:      Rate and Rhythm: Normal rate and regular rhythm  Heart sounds: Normal heart sounds  No murmur heard  No friction rub  No gallop  Pulmonary:      Effort: Pulmonary effort is normal  No respiratory distress  Breath sounds: Normal breath sounds  No wheezing or rales  Abdominal:      General: Bowel sounds are normal  There is no distension  Palpations: Abdomen is soft  There is no mass  Tenderness: There is no abdominal tenderness  There is no guarding  Musculoskeletal:         General: Tenderness present  No deformity  Normal range of motion        Cervical back: Normal range of motion and neck supple  No tenderness  Comments: No C, T, L step-offs or deformities  Patient does have some generalized thoracic and lumbar pain  No hematomas  No abdominal pain  Skin:     General: Skin is warm  Neurological:      Mental Status: She is alert and oriented to person, place, and time  Cranial Nerves: No cranial nerve deficit  Sensory: No sensory deficit  Motor: No abnormal muscle tone  Coordination: Coordination normal    Psychiatric:         Behavior: Behavior normal          Thought Content: Thought content normal          Judgment: Judgment normal          Cervical spine cleared by clinical criteria? Yes     Invasive Devices  Report    None                 Lab Results:   Results Reviewed     Procedure Component Value Units Date/Time    HS Troponin I 2hr [[id number]]  (Normal) Collected: 05/04/22 2313    Lab Status: Final result Specimen: Blood from Arm, Right Updated: 05/04/22 2339     hs TnI 2hr <2 ng/L      Delta 2hr hsTnI <0 ng/L     HS Troponin 0hr (reflex protocol) [[id number]]  (Normal) Collected: 05/04/22 2100    Lab Status: Final result Specimen: Blood from Arm, Right Updated: 05/04/22 2127     hs TnI 0hr 2 ng/L     Lactic acid [[id number]]  (Normal) Collected: 05/04/22 2100    Lab Status: Final result Specimen: Blood from Arm, Right Updated: 05/04/22 2123     LACTIC ACID 0 9 mmol/L     Narrative:      Result may be elevated if tourniquet was used during collection      Comprehensive metabolic panel [[id number]]  (Abnormal) Collected: 05/04/22 2100    Lab Status: Final result Specimen: Blood from Arm, Right Updated: 05/04/22 2119     Sodium 136 mmol/L      Potassium 3 8 mmol/L      Chloride 102 mmol/L      CO2 24 mmol/L      ANION GAP 10 mmol/L      BUN 16 mg/dL      Creatinine 1 53 mg/dL      Glucose 134 mg/dL      Calcium 8 5 mg/dL      AST 23 U/L      ALT 41 U/L      Alkaline Phosphatase 117 U/L      Total Protein 7 8 g/dL      Albumin 3 8 g/dL      Total Bilirubin 0 43 mg/dL      eGFR 36 ml/min/1 73sq m     Narrative:      National Kidney Disease Foundation guidelines for Chronic Kidney Disease (CKD):     Stage 1 with normal or high GFR (GFR > 90 mL/min/1 73 square meters)    Stage 2 Mild CKD (GFR = 60-89 mL/min/1 73 square meters)    Stage 3A Moderate CKD (GFR = 45-59 mL/min/1 73 square meters)    Stage 3B Moderate CKD (GFR = 30-44 mL/min/1 73 square meters)    Stage 4 Severe CKD (GFR = 15-29 mL/min/1 73 square meters)    Stage 5 End Stage CKD (GFR <15 mL/min/1 73 square meters)  Note: GFR calculation is accurate only with a steady state creatinine    Magnesium [[id number]]  (Normal) Collected: 05/04/22 2100    Lab Status: Final result Specimen: Blood from Arm, Right Updated: 05/04/22 2119     Magnesium 2 3 mg/dL     Protime-INR [[id number]]  (Normal) Collected: 05/04/22 2100    Lab Status: Final result Specimen: Blood from Arm, Right Updated: 05/04/22 2118     Protime 13 9 seconds      INR 1 13    APTT [[id number]]  (Abnormal) Collected: 05/04/22 2100    Lab Status: Final result Specimen: Blood from Arm, Right Updated: 05/04/22 2118     PTT 39 seconds     CBC and differential [[id number]]  (Abnormal) Collected: 05/04/22 2100    Lab Status: Final result Specimen: Blood from Arm, Right Updated: 05/04/22 2105     WBC 8 78 Thousand/uL      RBC 3 95 Million/uL      Hemoglobin 12 4 g/dL      Hematocrit 36 9 %      MCV 93 fL      MCH 31 4 pg      MCHC 33 6 g/dL      RDW 13 6 %      MPV 8 3 fL      Platelets 684 Thousands/uL      nRBC 0 /100 WBCs      Neutrophils Relative 68 %      Immat GRANS % 0 %      Lymphocytes Relative 28 %      Monocytes Relative 4 %      Eosinophils Relative 0 %      Basophils Relative 0 %      Neutrophils Absolute 5 93 Thousands/µL      Immature Grans Absolute 0 03 Thousand/uL      Lymphocytes Absolute 2 42 Thousands/µL      Monocytes Absolute 0 37 Thousand/µL      Eosinophils Absolute 0 00 Thousand/µL      Basophils Absolute 0 03 Thousands/µL Imaging Studies:   Direct to CT: No  CT chest abdomen pelvis w contrast   Final Result by Robles Mercer MD (05/04 2231)      Air-fluid levels throughout the colon suggesting colitis and diarrhea               Workstation performed: [id number]         CT head without contrast   Final Result by James Gallegos MD (05/04 2150)      No acute intracranial abnormality  Workstation performed: [id number]         XR chest 1 view portable   ED Interpretation by Carmel Goodwin MD (05/04 2249)   No acute consolidation or effusion, no obvious rib fractures      Final Result by Elfrieda Baumgarten, MD (05/05 9617)      No acute cardiopulmonary disease  Workstation performed: [id number]               Procedures  Procedures         ED Course  ED Course as of 05/14/22 0651   Wed May 04, 2022   2225 Procedure Note: EKG  Date/Time: 05/04/22 10:25 PM   Interpreted by: Daniel Dietz  Indications / Diagnosis:  Trauma  ECG reviewed by me, the ED Provider: yes   The EKG demonstrates:  Rhythm: normal sinus  Intervals: normal intervals  Axis: normal axis  QRS/Blocks: normal QRS  ST Changes: No acute ST Changes, no STD/JOSE LUIS  No diffuse elevations to indicate pericarditis  No coved ST elevations greater than 2mm with negative T waves in V1-3 to indicate concern for brugada  No biphasic T waves in V2, V3 to indicate Wellens (critical stenosis of LAD)  No elevation in aVR or deviation when compared to V1 (can be associated with ST depression in I,II, V4-6 when left main occlusion is present)  MDM  Number of Diagnoses or Management Options  Back pain  Diagnosis management comments: Patient presents after fall  Review records show that the patient does occasionally take aspirin so trauma alert was called  CT head without acute findings  Chest abdomen pelvis without acute findings  EKG without ischemic changes    Troponin and delta troponin normal   Patient did have mild raise in her creatinine, likely secondary to low fluid intake  Discussed all results with patient and  were bedside  Discussed possible admission for [place], they would prefer to go home which I believe is a reasonable choice at this time  Were provided fluids here in the emergency department  Patient was also found to be intermittently hypoxic in the high 80s  She ambulated around at approximately 90% room air  Patient does have a history of COPD which is currently managed by her primary care physician  I discussed with them that I believe, given her progressing symptoms, that she would benefit by a evaluation by pulmonology  Will place referral   Return precautions discussed  Disposition  [facility] Transfer: No  Final diagnoses:   Back pain     Time reflects when diagnosis was documented in both MDM as applicable and the Disposition within this note     Time User Action Codes Description Comment    5/5/2022 12:15 AM Stanley Branch Add [M54 9] Back pain       ED Disposition     ED Disposition   Discharge    Condition   Stable    Date/Time   Thu May 5, 2022 12:15 AM    Comment   Regla Bustos discharge to home/self care                 Follow-up Information     Follow up With Specialties Details Why Contact Info    Miguel Jorge MD Internal Medicine Schedule an appointment as soon as possible for a visit  Discuss your ED appointment and lab work [address] [facility] [address]  [phone]          Discharge Medication List as of 5/5/2022 12:17 AM      CONTINUE these medications which have NOT CHANGED    Details   albuterol (2 5 mg/3 mL) 0 083 % nebulizer solution Take 1 vial (2 5 mg total) by nebulization every 6 (six) hours as needed for wheezing or shortness of breath for up to 30 doses, Starting Thu 1/30/2020, Normal      albuterol (PROAIR HFA) 90 mcg/act inhaler Inhale, Starting Mon 11/4/2013, Historical Med      ALPRAZolam (XANAX) 1 mg tablet Take by mouth, Starting Fri 4/8/2011, Historical Med      aspirin (ECOTRIN LOW STRENGTH) 81 mg EC tablet Take 81 mg by mouth daily, Historical Med      clonazePAM (KlonoPIN) 1 mg tablet Take 1 mg by mouth 3 (three) times a day, Historical Med      methylPREDNISolone 4 MG tablet therapy pack Use as directed on package, Normal      omeprazole (PRILOSEC OTC) 20 MG tablet Take 20 mg by mouth daily, Starting Wed 5/29/2019, Historical Med      PARoxetine (PAXIL) 40 MG tablet Take 40 mg by mouth daily, Starting Tue 5/14/2019, Historical Med      predniSONE 50 mg tablet Take 1 tablet (50 mg total) by mouth daily, Starting Sun 5/24/2020, Normal      QUEtiapine (SEROquel) 400 MG tablet Take 800 mg by mouth, Starting Mon 6/3/2019, Historical Med      tiotropium (SPIRIVA) 18 mcg inhalation capsule Place 18 mcg into inhaler and inhale daily, Historical Med               PDMP Review     None          ED Provider  Electronically Signed by         Eusebio Shahid MD  05/05/22 [address] Jarett Puckett MD  05/14/22 0759

## 2022-05-14 ENCOUNTER — APPOINTMENT (EMERGENCY)
Dept: RADIOLOGY | Facility: HOSPITAL | Age: 62
End: 2022-05-14
Payer: COMMERCIAL

## 2022-05-14 ENCOUNTER — HOSPITAL ENCOUNTER (EMERGENCY)
Facility: HOSPITAL | Age: 62
Discharge: HOME | End: 2022-05-14
Attending: EMERGENCY MEDICINE
Payer: COMMERCIAL

## 2022-05-14 VITALS
WEIGHT: 193 LBS | BODY MASS INDEX: 30.29 KG/M2 | DIASTOLIC BLOOD PRESSURE: 68 MMHG | TEMPERATURE: 98 F | RESPIRATION RATE: 18 BRPM | SYSTOLIC BLOOD PRESSURE: 123 MMHG | HEART RATE: 98 BPM | HEIGHT: 67 IN | OXYGEN SATURATION: 95 %

## 2022-05-14 DIAGNOSIS — M79.671 RIGHT FOOT PAIN: Primary | ICD-10-CM

## 2022-05-14 PROCEDURE — 99283 EMERGENCY DEPT VISIT LOW MDM: CPT

## 2022-05-14 PROCEDURE — 73564 X-RAY EXAM KNEE 4 OR MORE: CPT

## 2022-05-14 PROCEDURE — 73630 X-RAY EXAM OF FOOT: CPT

## 2022-05-14 PROCEDURE — 99284 EMERGENCY DEPT VISIT MOD MDM: CPT | Performed by: EMERGENCY MEDICINE

## 2022-05-14 NOTE — ED PROVIDER NOTES
Final Diagnosis:  1  Right foot pain        Chief Complaint   Patient presents with    Leg Pain     Patient reports that she fell 8-10 days ago and hurt right leg  She was evaluated at that time but reports ongoing pain in right knee and foot     HPI  Patient presents for evaluation of right knee and foot pain  Patient was originally seen here in the emergency department approximately 1-2 weeks ago when she had a fall at home  Workup then showed no acute process and she was discharged home  She then states that she noticed over the following days that she had a large lump on the lateral aspect of her right foot  She also started to experience some mild discomfort in the lateral aspect of her anterior, superior right knee  This is worse with ambulation better at rest   As the pain is not gone away she is now here for re-evaluation  Patient denies additional trauma  No other complaints  Unless otherwise specified:  - No language barrier    - History obtained from patient  - There are no limitations to the history obtained  - Previous charting was reviewed    PMH:   has a past medical history of Avascular necrosis ([facility] ), Steward's esophagus, Cancer ([facility] ), COPD (chronic obstructive pulmonary disease) ([facility] ), Factor 5 Leiden mutation, heterozygous ([facility] ), and Hyperlipidemia  PSH:   has a past surgical history that includes Tonsillectomy and Cholecystectomy  ROS:  Review of Systems   -   - 13 point ROS was performed and all are normal unless stated in the history above  - Nursing note reviewed  Vitals reviewed  - Orders placed by myself and/or advanced practitioner / resident  PE:   Vitals:    05/14/22 1405   BP: 121/79   BP Location: Right arm   Pulse: (!) 110   Resp: 18   Temp: 98 °F (36 7 °C)   TempSrc: Temporal   SpO2: 98%   Weight: 87 5 kg (193 lb)   Height: 5' 7" (1 702 m)     Vitals reviewed by me  Patient with mild swelling and tenderness to lateral aspect of right foot  Range of motion is intact  No obvious sign of trauma  No bruising  No bony tenderness  No midfoot bruising  Achilles intact  Patient without any tenderness to palpation over right knee  Patella with free range of motion  Unless otherwise specified above:    General: VS reviewed  Appears in NAD    Head: Normocephalic, atraumatic  Eyes: EOM-I  No exudate  ENT: Atraumatic external nose and ears  No malocclusion  No stridor  No drooling  Neck: No JVD  CV: No pallor noted  Lungs:   No tachypnea  No respiratory distress    Abdomen:  Soft, non-tender, non-distended    MSK:   FROM spontaneously  No obvious deformity    Skin: Dry, intact  No obvious rash  Neuro: Awake, alert, GCS15, CN II-XII grossly intact  Speaking in full sentences  Motor grossly intact  Psychiatric/Behavioral: Appropriate mood and affect   Exam: deferred    Physical Exam     Procedures   A:  - Nursing note reviewed  XR knee 4+ vw right injury   ED Interpretation   No acute fracture dislocation      XR foot 3+ views RIGHT   ED Interpretation   No acute fracture or dislocation        Orders Placed This Encounter   Procedures    XR foot 3+ views RIGHT    XR knee 4+ vw right injury     Labs Reviewed - No data to display      Final Diagnosis:  1  Right foot pain        P:  - patient presents for evaluation after fall  As this happened over a week ago and she has been ambulating there is low suspicion for fracture  Will evaluate with x-rays   -no acute findings on imaging  Discussed return precautions as well as follow-up with orthopedics as needed      Medications - No data to display  Time reflects when diagnosis was documented in both MDM as applicable and the Disposition within this note     Time User Action Codes Description Comment    5/14/2022  3:13 PM Cipriano Shelby Add [F47 882] Right foot pain       ED Disposition     ED Disposition   Discharge    Condition   Stable    Date/Time   Sat May 14, 2022  3:12 PM    Comment   Jean Pierre Macias discharge to home/self care  Follow-up Information     Follow up With Specialties Details Why Contact Info Additional [address] [facility] Schedule an appointment as soon as possible for a visit  As needed [address] [facility] [phone] [address] [facility] [place] [facility] [address] [facility], [place], [place], [address]        Patient's Medications   Discharge Prescriptions    No medications on file     No discharge procedures on file  Prior to Admission Medications   Prescriptions Last Dose Informant Patient Reported? Taking? ALPRAZolam (XANAX) 1 mg tablet   Yes No   Sig: Take by mouth   PARoxetine (PAXIL) 40 MG tablet   Yes No   Sig: Take 40 mg by mouth daily   QUEtiapine (SEROquel) 400 MG tablet   Yes No   Sig: Take 800 mg by mouth   albuterol (2 5 mg/3 mL) 0 083 % nebulizer solution   No No   Sig: Take 1 vial (2 5 mg total) by nebulization every 6 (six) hours as needed for wheezing or shortness of breath for up to 30 doses   albuterol (PROAIR HFA) 90 mcg/act inhaler   Yes No   Sig: Inhale   aspirin (ECOTRIN LOW STRENGTH) 81 mg EC tablet   Yes No   Sig: Take 81 mg by mouth daily   clonazePAM (KlonoPIN) 1 mg tablet   Yes No   Sig: Take 1 mg by mouth 3 (three) times a day   methylPREDNISolone 4 MG tablet therapy pack   No No   Sig: Use as directed on package   Patient not taking: Reported on 5/24/2020   omeprazole (PRILOSEC OTC) 20 MG tablet   Yes No   Sig: Take 20 mg by mouth daily   predniSONE 50 mg tablet   No No   Sig: Take 1 tablet (50 mg total) by mouth daily   tiotropium (SPIRIVA) 18 mcg inhalation capsule   Yes No   Sig: Place 18 mcg into inhaler and inhale daily      Facility-Administered Medications: None       Portions of the record may have been created with voice recognition software   Occasional wrong word or "sound a like" substitutions may have occurred due to the inherent limitations of voice recognition software  Read the chart carefully and recognize, using context, where substitutions have occurred      Electronically signed by:  Ferrel Hodgkin, MD Clabe Qua, MD  05/14/22 2951

## 2022-11-26 ENCOUNTER — OFFICE VISIT (OUTPATIENT)
Dept: URGENT CARE | Facility: MEDICAL CENTER | Age: 62
End: 2022-11-26

## 2022-11-26 ENCOUNTER — HOSPITAL ENCOUNTER (EMERGENCY)
Facility: HOSPITAL | Age: 62
Discharge: HOME | End: 2022-11-26
Attending: EMERGENCY MEDICINE

## 2022-11-26 ENCOUNTER — APPOINTMENT (EMERGENCY)
Dept: CT IMAGING | Facility: HOSPITAL | Age: 62
End: 2022-11-26

## 2022-11-26 VITALS
BODY MASS INDEX: 29.41 KG/M2 | WEIGHT: 187.8 LBS | HEART RATE: 93 BPM | TEMPERATURE: 97.4 F | RESPIRATION RATE: 24 BRPM | DIASTOLIC BLOOD PRESSURE: 90 MMHG | SYSTOLIC BLOOD PRESSURE: 98 MMHG | OXYGEN SATURATION: 97 %

## 2022-11-26 VITALS
BODY MASS INDEX: 29.29 KG/M2 | SYSTOLIC BLOOD PRESSURE: 121 MMHG | WEIGHT: 187 LBS | HEART RATE: 74 BPM | OXYGEN SATURATION: 98 % | RESPIRATION RATE: 18 BRPM | DIASTOLIC BLOOD PRESSURE: 74 MMHG | TEMPERATURE: 97.6 F

## 2022-11-26 DIAGNOSIS — R10.9 ABDOMINAL PAIN, UNSPECIFIED ABDOMINAL LOCATION: Primary | ICD-10-CM

## 2022-11-26 DIAGNOSIS — R10.84 GENERALIZED ABDOMINAL PAIN: Primary | ICD-10-CM

## 2022-11-26 LAB
2HR DELTA HS TROPONIN: >0 NG/L
ALBUMIN SERPL BCP-MCNC: 3.9 G/DL (ref 3.5–5)
ALP SERPL-CCNC: 76 U/L (ref 46–116)
ALT SERPL W P-5'-P-CCNC: 35 U/L (ref 12–78)
ANION GAP SERPL CALCULATED.3IONS-SCNC: 10 MMOL/L (ref 4–13)
APTT PPP: 42 SECONDS (ref 23–37)
ATRIAL RATE: 77 BPM
BACTERIA UR QL AUTO: NORMAL /HPF
BASOPHILS # BLD AUTO: 0.02 THOUSANDS/ÂΜL (ref 0–0.1)
BASOPHILS NFR BLD AUTO: 0 % (ref 0–1)
BILIRUB SERPL-MCNC: 0.48 MG/DL (ref 0.2–1)
BILIRUB UR QL STRIP: NEGATIVE
BUN SERPL-MCNC: 16 MG/DL (ref 5–25)
CALCIUM SERPL-MCNC: 9 MG/DL (ref 8.3–10.1)
CARDIAC TROPONIN I PNL SERPL HS: 2 NG/L
CARDIAC TROPONIN I PNL SERPL HS: <2 NG/L
CHLORIDE SERPL-SCNC: 104 MMOL/L (ref 96–108)
CLARITY UR: CLEAR
CO2 SERPL-SCNC: 23 MMOL/L (ref 21–32)
COLOR UR: YELLOW
CREAT SERPL-MCNC: 1.17 MG/DL (ref 0.6–1.3)
EOSINOPHIL # BLD AUTO: 0 THOUSAND/ÂΜL (ref 0–0.61)
EOSINOPHIL NFR BLD AUTO: 0 % (ref 0–6)
ERYTHROCYTE [DISTWIDTH] IN BLOOD BY AUTOMATED COUNT: 13.2 % (ref 11.6–15.1)
FLUAV RNA RESP QL NAA+PROBE: NEGATIVE
FLUBV RNA RESP QL NAA+PROBE: NEGATIVE
GFR SERPL CREATININE-BSD FRML MDRD: 50 ML/MIN/1.73SQ M
GLUCOSE SERPL-MCNC: 107 MG/DL (ref 65–140)
GLUCOSE UR STRIP-MCNC: NEGATIVE MG/DL
HCT VFR BLD AUTO: 37.7 % (ref 34.8–46.1)
HGB BLD-MCNC: 12.9 G/DL (ref 11.5–15.4)
HGB UR QL STRIP.AUTO: NEGATIVE
IMM GRANULOCYTES # BLD AUTO: 0.01 THOUSAND/UL (ref 0–0.2)
IMM GRANULOCYTES NFR BLD AUTO: 0 % (ref 0–2)
INR PPP: 1.12 (ref 0.84–1.19)
KETONES UR STRIP-MCNC: NEGATIVE MG/DL
LACTATE SERPL-SCNC: 0.3 MMOL/L (ref 0.5–2)
LEUKOCYTE ESTERASE UR QL STRIP: ABNORMAL
LIPASE SERPL-CCNC: 48 U/L (ref 73–393)
LYMPHOCYTES # BLD AUTO: 2.7 THOUSANDS/ÂΜL (ref 0.6–4.47)
LYMPHOCYTES NFR BLD AUTO: 54 % (ref 14–44)
MAGNESIUM SERPL-MCNC: 2.1 MG/DL (ref 1.6–2.6)
MCH RBC QN AUTO: 31.2 PG (ref 26.8–34.3)
MCHC RBC AUTO-ENTMCNC: 34.2 G/DL (ref 31.4–37.4)
MCV RBC AUTO: 91 FL (ref 82–98)
MONOCYTES # BLD AUTO: 0.2 THOUSAND/ÂΜL (ref 0.17–1.22)
MONOCYTES NFR BLD AUTO: 4 % (ref 4–12)
NEUTROPHILS # BLD AUTO: 2.09 THOUSANDS/ÂΜL (ref 1.85–7.62)
NEUTS SEG NFR BLD AUTO: 42 % (ref 43–75)
NITRITE UR QL STRIP: NEGATIVE
NON-SQ EPI CELLS URNS QL MICRO: NORMAL /HPF
NRBC BLD AUTO-RTO: 0 /100 WBCS
P AXIS: 53 DEGREES
PH UR STRIP.AUTO: 6.5 [PH]
PLATELET # BLD AUTO: 139 THOUSANDS/UL (ref 149–390)
PMV BLD AUTO: 8.4 FL (ref 8.9–12.7)
POTASSIUM SERPL-SCNC: 3.5 MMOL/L (ref 3.5–5.3)
PR INTERVAL: 158 MS
PROT SERPL-MCNC: 7.4 G/DL (ref 6.4–8.4)
PROT UR STRIP-MCNC: NEGATIVE MG/DL
PROTHROMBIN TIME: 14.7 SECONDS (ref 11.6–14.5)
QRS AXIS: 28 DEGREES
QRSD INTERVAL: 90 MS
QT INTERVAL: 394 MS
QTC INTERVAL: 445 MS
RBC # BLD AUTO: 4.13 MILLION/UL (ref 3.81–5.12)
RBC #/AREA URNS AUTO: NORMAL /HPF
RSV RNA RESP QL NAA+PROBE: NEGATIVE
SARS-COV-2 RNA RESP QL NAA+PROBE: NEGATIVE
SODIUM SERPL-SCNC: 137 MMOL/L (ref 135–147)
SP GR UR STRIP.AUTO: <=1.005 (ref 1–1.03)
T WAVE AXIS: 49 DEGREES
UROBILINOGEN UR QL STRIP.AUTO: 0.2 E.U./DL
VENTRICULAR RATE: 77 BPM
WBC # BLD AUTO: 5.02 THOUSAND/UL (ref 4.31–10.16)
WBC #/AREA URNS AUTO: NORMAL /HPF

## 2022-11-26 RX ORDER — ONDANSETRON 2 MG/ML
4 INJECTION INTRAMUSCULAR; INTRAVENOUS ONCE
Status: COMPLETED | OUTPATIENT
Start: 2022-11-26 | End: 2022-11-26

## 2022-11-26 RX ORDER — SUCRALFATE 1 G/1
1 TABLET ORAL 4 TIMES DAILY
Qty: 28 TABLET | Refills: 0 | Status: SHIPPED | OUTPATIENT
Start: 2022-11-26 | End: 2022-12-03

## 2022-11-26 RX ORDER — ONDANSETRON 4 MG/1
4 TABLET, ORALLY DISINTEGRATING ORAL EVERY 6 HOURS PRN
Qty: 20 TABLET | Refills: 0 | Status: SHIPPED | OUTPATIENT
Start: 2022-11-26

## 2022-11-26 RX ADMIN — SODIUM CHLORIDE, POTASSIUM CHLORIDE, SODIUM LACTATE AND CALCIUM CHLORIDE 1000 ML: 600; 310; 30; 20 INJECTION, SOLUTION INTRAVENOUS at 17:29

## 2022-11-26 RX ADMIN — IOHEXOL 100 ML: 350 INJECTION, SOLUTION INTRAVENOUS at 19:47

## 2022-11-26 RX ADMIN — ONDANSETRON 4 MG: 2 INJECTION INTRAMUSCULAR; INTRAVENOUS at 17:30

## 2022-11-26 NOTE — PROGRESS NOTES
[facility]        NAME: Dior Horn is a 58 y o  female  : 1960    MRN: [id number]  DATE: 2022  TIME: 4:13 PM    Assessment and Plan   Abdominal pain, unspecified abdominal location [R10 9]  1  Abdominal pain, unspecified abdominal location  Transfer to other facility            Patient Instructions       Go directly to the emergency room for further evaluation  Chief Complaint     Chief Complaint   Patient presents with   • stomach pain     With nausea, lump on left side of abdomen, no food in 5 days  History of Present Illness       Patient presents with a 5 day history of a lump on the left side of her abdomen epigastric pain left lower quadrant pain and nausea  She has been unable to eat for the past 5 days and has been drinking small amounts of water  Patient is still urinating  No fever or chills  Review of Systems   Review of Systems   Constitutional: Negative for fever  Gastrointestinal: Positive for diarrhea (1 week ago lasted for 3 days) and nausea  Negative for vomiting           Current Medications       Current Outpatient Medications:   •  albuterol (2 5 mg/3 mL) 0 083 % nebulizer solution, Take 1 vial (2 5 mg total) by nebulization every 6 (six) hours as needed for wheezing or shortness of breath for up to 30 doses, Disp: 30 vial, Rfl: 0  •  albuterol (PROVENTIL HFA,VENTOLIN HFA) 90 mcg/act inhaler, Inhale, Disp: , Rfl:   •  ALPRAZolam (XANAX) 1 mg tablet, Take by mouth, Disp: , Rfl:   •  aspirin (ECOTRIN LOW STRENGTH) 81 mg EC tablet, Take 81 mg by mouth daily, Disp: , Rfl:   •  clonazePAM (KlonoPIN) 1 mg tablet, Take 1 mg by mouth 3 (three) times a day, Disp: , Rfl:   •  omeprazole (PriLOSEC OTC) 20 MG tablet, Take 20 mg by mouth daily, Disp: , Rfl:   •  PARoxetine (PAXIL) 40 MG tablet, Take 40 mg by mouth daily, Disp: , Rfl:   •  predniSONE 50 mg tablet, Take 1 tablet (50 mg total) by mouth daily, Disp: 4 tablet, Rfl: 0  •  QUEtiapine (SEROquel) 400 MG tablet, Take 800 mg by mouth, Disp: , Rfl:   •  tiotropium (SPIRIVA) 18 mcg inhalation capsule, Place 18 mcg into inhaler and inhale daily, Disp: , Rfl:   •  methylPREDNISolone 4 MG tablet therapy pack, Use as directed on package (Patient not taking: Reported on 5/24/2020), Disp: 21 tablet, Rfl: 0    Current Allergies     Allergies as of 11/26/2022   • (No Known Allergies)            The following portions of the patient's history were reviewed and updated as appropriate: allergies, current medications, past family history, past medical history, past social history, past surgical history and problem list      Past Medical History:   Diagnosis Date   • Avascular necrosis ([facility] )    • Steward's esophagus    • Cancer ([facility] )    • COPD (chronic obstructive pulmonary disease) ([facility] )    • Factor 5 Leiden mutation, heterozygous ([place] [address] )    • Hyperlipidemia        Past Surgical History:   Procedure Laterality Date   • CHOLECYSTECTOMY     • TONSILLECTOMY         No family history on file  Medications have been verified  Objective   BP 98/90   Pulse 93   Temp (!) 97 4 °F (36 3 °C)   Resp (!) 24   Wt 85 2 kg (187 lb 12 8 oz)   SpO2 97%   BMI 29 41 kg/m²   No LMP recorded  Patient is postmenopausal        Physical Exam     Physical Exam  Vitals and nursing note reviewed  Constitutional:       Appearance: She is ill-appearing  HENT:      Head: Normocephalic and atraumatic  Mouth/Throat:      Mouth: Mucous membranes are dry  Cardiovascular:      Rate and Rhythm: Normal rate and regular rhythm  Pulmonary:      Effort: Pulmonary effort is normal    Abdominal:      Tenderness: There is abdominal tenderness  There is no guarding or rebound  Skin:     General: Skin is warm  Neurological:      Mental Status: She is alert

## 2022-11-26 NOTE — ED PROVIDER NOTES
History  Chief Complaint   Patient presents with   • Abdominal Pain     Patient presents with left sided abdominal pain and nausea starting 4 days ago  Denies vomiting or diarrhea  HPI  This is a 41-year-old female with a past medical history significant for anxiety, depression, history of Steward's esophagus, hyperlipidemia, COPD, who presents to the emergency department from urgent care for evaluation of abdominal pain  Patient reports 4-5 days of new onset abdominal discomfort reports is generalized without radiation  She reports associated nausea no vomiting  He reports decreased appetite nausea she really has not eaten or drank much in the last 4 days  No history of similar symptoms in the past   She denies recent surgeries she reports history of cholecystectomy but denies any other abdominal surgeries  She reports a history of EGD in the past was told she has Steward's esophagus she is compliant with her Prilosec she took it today  He denies any recent sick contacts  Reports some loose stools throughout the illness denies blood in stool  Denies difficulty urinating blood in urine change in urine odor  Denies any recent surgeries hospital admissions or antibiotics      Prior to Admission Medications   Prescriptions Last Dose Informant Patient Reported? Taking?    ALPRAZolam (XANAX) 1 mg tablet   Yes No   Sig: Take by mouth   PARoxetine (PAXIL) 40 MG tablet   Yes No   Sig: Take 40 mg by mouth daily   QUEtiapine (SEROquel) 400 MG tablet   Yes No   Sig: Take 800 mg by mouth   albuterol (2 5 mg/3 mL) 0 083 % nebulizer solution   No No   Sig: Take 1 vial (2 5 mg total) by nebulization every 6 (six) hours as needed for wheezing or shortness of breath for up to 30 doses   albuterol (PROVENTIL HFA,VENTOLIN HFA) 90 mcg/act inhaler   Yes No   Sig: Inhale   aspirin (ECOTRIN LOW STRENGTH) 81 mg EC tablet   Yes No   Sig: Take 81 mg by mouth daily   clonazePAM (KlonoPIN) 1 mg tablet   Yes No   Sig: Take 1 mg by mouth 3 (three) times a day   methylPREDNISolone 4 MG tablet therapy pack   No No   Sig: Use as directed on package   Patient not taking: Reported on 5/24/2020   omeprazole (PriLOSEC OTC) 20 MG tablet   Yes No   Sig: Take 20 mg by mouth daily   predniSONE 50 mg tablet   No No   Sig: Take 1 tablet (50 mg total) by mouth daily   tiotropium (SPIRIVA) 18 mcg inhalation capsule   Yes No   Sig: Place 18 mcg into inhaler and inhale daily      Facility-Administered Medications: None       Past Medical History:   Diagnosis Date   • Avascular necrosis (HCC)    • Steward's esophagus    • Cancer (HCC)    • COPD (chronic obstructive pulmonary disease) ([facility] )    • Factor 5 Leiden mutation, heterozygous ([facility] )    • Hyperlipidemia        Past Surgical History:   Procedure Laterality Date   • CHOLECYSTECTOMY     • TONSILLECTOMY         History reviewed  No pertinent family history  I have reviewed and agree with the history as documented  E-Cigarette/Vaping   • E-Cigarette Use Never User      E-Cigarette/Vaping Substances     Social History     Tobacco Use   • Smoking status: Every Day     Packs/day: 1 00     Types: Cigarettes   • Smokeless tobacco: Never   Vaping Use   • Vaping Use: Never used   Substance Use Topics   • Alcohol use: Not Currently   • Drug use: Not Currently       Review of Systems   Constitutional: Positive for activity change and appetite change  Negative for chills and fever  HENT: Negative for congestion, ear pain, sinus pressure, sinus pain, sore throat and voice change  Eyes: Negative for pain and visual disturbance  Respiratory: Negative for cough and shortness of breath  Cardiovascular: Negative for chest pain and palpitations  Gastrointestinal: Positive for abdominal pain, diarrhea and nausea  Negative for vomiting  Genitourinary: Negative for difficulty urinating, dysuria, flank pain, frequency and hematuria  Musculoskeletal: Negative for arthralgias, back pain and neck pain     Skin: Negative for color change and rash  Neurological: Negative for seizures and syncope  All other systems reviewed and are negative  Physical Exam  Physical Exam  Vitals and nursing note reviewed  Exam conducted with a chaperone present  Constitutional:       General: She is not in acute distress  Appearance: She is well-developed  HENT:      Head: Normocephalic and atraumatic  Eyes:      Conjunctiva/sclera: Conjunctivae normal    Cardiovascular:      Rate and Rhythm: Normal rate and regular rhythm  Heart sounds: No murmur heard  Pulmonary:      Effort: Pulmonary effort is normal  No respiratory distress  Breath sounds: Normal breath sounds  Abdominal:      General: Abdomen is flat  There is no distension  Palpations: Abdomen is soft  Tenderness: There is no abdominal tenderness  Hernia: No hernia is present  Musculoskeletal:         General: No swelling  Cervical back: Neck supple  Skin:     General: Skin is warm and dry  Capillary Refill: Capillary refill takes less than 2 seconds  Neurological:      Mental Status: She is alert     Psychiatric:         Mood and Affect: Mood normal          Vital Signs  ED Triage Vitals [11/26/22 1703]   Temperature Pulse Respirations Blood Pressure SpO2   97 6 °F (36 4 °C) 94 20 128/81 98 %      Temp Source Heart Rate Source Patient Position - Orthostatic VS BP Location FiO2 (%)   Temporal Monitor Lying Left arm --      Pain Score       8           Vitals:    11/26/22 1703 11/26/22 1800 11/26/22 1830   BP: 128/81 123/71 121/74   Pulse: 94 76 74   Patient Position - Orthostatic VS: Lying Sitting Sitting         Visual Acuity      ED Medications  Medications   lactated ringers bolus 1,000 mL (0 mL Intravenous Stopped 11/26/22 1922)   ondansetron (ZOFRAN) injection 4 mg (4 mg Intravenous Given 11/26/22 1730)   iohexol (OMNIPAQUE) 350 MG/ML injection (SINGLE-DOSE) 100 mL (100 mL Intravenous Given 11/26/22 1947) Diagnostic Studies  Results Reviewed     Procedure Component Value Units Date/Time    HS Troponin I 2hr [[id number]]  (Normal) Collected: 11/26/22 1913    Lab Status: Final result Specimen: Blood from Arm, Right Updated: 11/26/22 1951     hs TnI 2hr 2 ng/L      Delta 2hr hsTnI >0 ng/L     Urine Microscopic [[id number]]  (Normal) Collected: 11/26/22 1913    Lab Status: Final result Specimen: Urine, Clean Catch Updated: 11/26/22 1948     RBC, UA None Seen /hpf      WBC, UA 1-2 /hpf      Epithelial Cells Occasional /hpf      Bacteria, UA Occasional /hpf     UA w Reflex to Microscopic w Reflex to Culture [[id number]]  (Abnormal) Collected: 11/26/22 1913    Lab Status: Final result Specimen: Urine, Clean Catch Updated: 11/26/22 1933     Color, UA Yellow     Clarity, UA Clear     Specific Gravity, UA <=1 005     pH, UA 6 5     Leukocytes, UA Trace     Nitrite, UA Negative     Protein, UA Negative mg/dl      Glucose, UA Negative mg/dl      Ketones, UA Negative mg/dl      Urobilinogen, UA 0 2 E U /dl      Bilirubin, UA Negative     Occult Blood, UA Negative    HS Troponin I 4hr [[id number]]     Lab Status: No result Specimen: Blood     Lactic acid [[id number]]  (Abnormal) Collected: 11/26/22 1728    Lab Status: Final result Specimen: Blood from Arm, Right Updated: 11/26/22 1817     LACTIC ACID 0 3 mmol/L     Narrative:      Result may be elevated if tourniquet was used during collection  FLU/RSV/COVID - if FLU/RSV clinically relevant [[id number]]  (Normal) Collected: 11/26/22 1728    Lab Status: Final result Specimen: Nasopharyngeal Swab Updated: 11/26/22 1815     SARS-CoV-2 Negative     INFLUENZA A PCR Negative     INFLUENZA B PCR Negative     RSV PCR Negative    Narrative:      FOR PEDIATRIC PATIENTS - copy/paste COVID Guidelines URL to browser: [email or web address]    SARS-CoV-2 assay is a Nucleic Acid Amplification assay intended for the  qualitative detection of nucleic acid from SARS-CoV-2 in nasopharyngeal  swabs  Results are for the presumptive identification of SARS-CoV-2 RNA  Positive results are indicative of infection with SARS-CoV-2, the virus  causing COVID-19, but do not rule out bacterial infection or co-infection  with other viruses  Laboratories within the [place] and its  territories are required to report all positive results to the appropriate  public health authorities  Negative results do not preclude SARS-CoV-2  infection and should not be used as the sole basis for treatment or other  patient management decisions  Negative results must be combined with  clinical observations, patient history, and epidemiological information  This test has not been FDA cleared or approved  This test has been authorized by FDA under an Emergency Use Authorization  (EUA)  This test is only authorized for the duration of time the  declaration that circumstances exist justifying the authorization of the  emergency use of an in vitro diagnostic tests for detection of SARS-CoV-2  virus and/or diagnosis of COVID-19 infection under section 564(b)(1) of  the Act, 21 U  S C  784REA-1(R)(3), unless the authorization is terminated  or revoked sooner  The test has been validated but independent review by FDA  and CLIA is pending  Test performed using [employer] GeneXpert: This RT-PCR assay targets N2,  a region unique to SARS-CoV-2  A conserved region in the E-gene was chosen  for pan-Sarbecovirus detection which includes SARS-CoV-2  According to CMS-2020-01-R, this platform meets the definition of high-throughput technology      HS Troponin 0hr (reflex protocol) [[id number]]  (Normal) Collected: 11/26/22 1728    Lab Status: Final result Specimen: Blood from Arm, Right Updated: 11/26/22 1803     hs TnI 0hr <2 ng/L     Comprehensive metabolic panel [[id number]] Collected: 11/26/22 1728    Lab Status: Final result Specimen: Blood from Arm, Right Updated: 11/26/22 8076 Sodium 137 mmol/L      Potassium 3 5 mmol/L      Chloride 104 mmol/L      CO2 23 mmol/L      ANION GAP 10 mmol/L      BUN 16 mg/dL      Creatinine 1 17 mg/dL      Glucose 107 mg/dL      Calcium 9 0 mg/dL      AST --     ALT 35 U/L      Alkaline Phosphatase 76 U/L      Total Protein 7 4 g/dL      Albumin 3 9 g/dL      Total Bilirubin 0 48 mg/dL      eGFR 50 ml/min/1 73sq m     Narrative:      [place] guidelines for Chronic Kidney Disease (CKD):   •  Stage 1 with normal or high GFR (GFR > 90 mL/min/1 73 square meters)  •  Stage 2 Mild CKD (GFR = 60-89 mL/min/1 73 square meters)  •  Stage 3A Moderate CKD (GFR = 45-59 mL/min/1 73 square meters)  •  Stage 3B Moderate CKD (GFR = 30-44 mL/min/1 73 square meters)  •  Stage 4 Severe CKD (GFR = 15-29 mL/min/1 73 square meters)  •  Stage 5 End Stage CKD (GFR <15 mL/min/1 73 square meters)  Note: GFR calculation is accurate only with a steady state creatinine    Magnesium [[id number]]  (Normal) Collected: 11/26/22 1728    Lab Status: Final result Specimen: Blood from Arm, Right Updated: 11/26/22 1756     Magnesium 2 1 mg/dL     Lipase [[id number]]  (Abnormal) Collected: 11/26/22 1728    Lab Status: Final result Specimen: Blood from Arm, Right Updated: 11/26/22 1756     Lipase 48 u/L     APTT [[id number]]  (Abnormal) Collected: 11/26/22 1728    Lab Status: Final result Specimen: Blood from Arm, Right Updated: 11/26/22 1752     PTT 42 seconds     Protime-INR [[id number]]  (Abnormal) Collected: 11/26/22 1728    Lab Status: Final result Specimen: Blood from Arm, Right Updated: 11/26/22 1752     Protime 14 7 seconds      INR 1 12    CBC and differential [[id number]]  (Abnormal) Collected: 11/26/22 1728    Lab Status: Final result Specimen: Blood from Arm, Right Updated: 11/26/22 1735     WBC 5 02 Thousand/uL      RBC 4 13 Million/uL      Hemoglobin 12 9 g/dL      Hematocrit 37 7 %      MCV 91 fL      MCH 31 2 pg      MCHC 34 2 g/dL      RDW 13 2 %      MPV 8 4 fL      Platelets 402 Thousands/uL      nRBC 0 /100 WBCs      Neutrophils Relative 42 %      Immat GRANS % 0 %      Lymphocytes Relative 54 %      Monocytes Relative 4 %      Eosinophils Relative 0 %      Basophils Relative 0 %      Neutrophils Absolute 2 09 Thousands/µL      Immature Grans Absolute 0 01 Thousand/uL      Lymphocytes Absolute 2 70 Thousands/µL      Monocytes Absolute 0 20 Thousand/µL      Eosinophils Absolute 0 00 Thousand/µL      Basophils Absolute 0 02 Thousands/µL                  CT abdomen pelvis with contrast    (Results Pending)              Procedures  Procedures         ED Course  ED Course as of 11/26/22 1952   Sat Nov 26, 2022   1740 EKG interpreted by myself  EKG dated 11/26/2022 at 5:37 p m  Demonstrates normal sinus rhythm at 77 beats per minute, normal AL interval, normal QRS interval, normal QTC interval, no STEMI                                              MDM  Number of Diagnoses or Management Options  Generalized abdominal pain: new and requires workup     Amount and/or Complexity of Data Reviewed  Clinical lab tests: ordered and reviewed  Tests in the radiology section of CPT®: ordered and reviewed  Tests in the medicine section of CPT®: reviewed and ordered  Review and summarize past medical records: yes  Independent visualization of images, tracings, or specimens: yes    Risk of Complications, Morbidity, and/or Mortality  Presenting problems: moderate  Diagnostic procedures: moderate  Management options: moderate    Patient Progress  Patient progress: stable    80-year-old female presenting for evaluation of abdominal pain times several days  Benign examination she was sent here by urgent care for evaluation  Labs were ordered as well as CT scan  Labs reviewed no significant abnormal findings at this time  Patient without vomiting    At this point in the workup the patient informed me that she just found out her mother was taken to another hospital by ambulance and she reports that she will need to leave against medical advice  I did  the patient at the bedside it seems she has decision-making capacity understands risks of her decision  I am but thighs with her decision to leave in light of acute medical emergency with another family member  I advised her to return if possible  I was able to convince her to remain in the ER to get through the CT scan but I discharge her against medical advice afterwards after had her sign papers in the room  I reviewed the CT scan myself nothing is jumping out at me no evidence of pneumoperitoneum or bowel obstruction at this moment  This is still pending official Radiology report  I did tell the patient I would contact her with the results if there were any significant results  Otherwise my suspicion is for some gastritis or mucosal irritation along the GI tract did discuss continue her Prilosec, starting Carafate, providing Zofran as well for discharge as well as referral to GI for follow-up  Disposition  Final diagnoses:   Generalized abdominal pain     Time reflects when diagnosis was documented in both MDM as applicable and the Disposition within this note     Time User Action Codes Description Comment    11/26/2022  7:43 PM Maikel Cordoba Add [R10 84] Generalized abdominal pain       ED Disposition     ED Disposition   AMA    Condition   --    Date/Time   Sat Nov 26, 2022  7:43 PM    Comment   Date: 11/26/2022  Patient: Claritza Herring  Admitted: 11/26/2022  4:57 PM  Attending Provider: DO Claritza Castaneda Nevaeh or her authorized caregiver has made the decision for the patient to leave the emergency department against the adv ice of the emergency department staff  She or her authorized caregiver has been informed and understands the inherent risks, including death, morbidity  She or her authorized caregiver has decided to accept the responsibility for this decision   Mariam Laguna and all necessary parties have been advised that she may return for further evaluation or treatment  Her condition at time of discharge was stable    Christa Ruffin had current vital signs as follows:  /74 (BP Location: Left arm)   Pul se 74   Temp 97 6 °F (36 4 °C) (Temporal)   Resp 18   Wt 84 8 kg (187 lb)            Follow-up Information     Follow up With Specialties Details Why Contact Info Additional [facility] Schedule an appointment as soon as possible for a visit   [address] [phone]  Caitlin Vivas [facility] [address], [place], [place], [facility] 3    Claire Taylor MD Internal Medicine Schedule an appointment as soon as possible for a visit   [address] Emergency Department Emergency Medicine Go to  If symptoms worsen [place] [phone] [facility] Emergency Department, [address], [address]          Patient's Medications   Discharge Prescriptions    ONDANSETRON (ZOFRAN ODT) 4 MG DISINTEGRATING TABLET    Take 1 tablet (4 mg total) by mouth every 6 (six) hours as needed for nausea or vomiting       Start Date: 11/26/2022End Date: --       Order Dose: 4 mg       Quantity: 20 tablet    Refills: 0    SUCRALFATE (CARAFATE) 1 G TABLET    Take 1 tablet (1 g total) by mouth 4 (four) times a day for 7 days       Start Date: 11/26/2022End Date: 12/3/2022       Order Dose: 1 g       Quantity: 28 tablet    Refills: 0           PDMP Review     None          ED Provider  Electronically Signed by           Nicolas Rowe DO  11/26/22 1952

## 2022-11-28 LAB
ATRIAL RATE: 77 BPM
P AXIS: 53 DEGREES
PR INTERVAL: 158 MS
QRS AXIS: 28 DEGREES
QRSD INTERVAL: 90 MS
QT INTERVAL: 394 MS
QTC INTERVAL: 445 MS
T WAVE AXIS: 49 DEGREES
VENTRICULAR RATE: 77 BPM

## 2023-09-08 ENCOUNTER — TELEPHONE (OUTPATIENT)
Dept: PSYCHIATRY | Facility: CLINIC | Age: 63
End: 2023-09-08

## 2023-09-08 NOTE — LETTER
Dear Estiven Montiel :    We are contacting you because your name is currently included on the [address] [place] [address] wait-list for Talk Therapy and/or Medication Management. (Please [place] which services are needed)     In our efforts to provide the highest quality care, [facility] has begun the process of upgrading our behavioral health systems to increase efficiency and expedite delivery of services. As part of this process, we ask you to please confirm your continued interest in the services above. If you are no longer interested or in need, please roberto “No” in the area below. If you are still interested and in need, please roberto “Yes” and provide your most current demographic and insurance information within 15 days. If we do not receive confirmation from you by 2023 your information will not be included in the system upgrade and your place on the waitlist will be lost.     Thank you in advance for your patience and understanding and we apologize for any inconvenience this may cause. Patient Name and :    Still in need of services: Yes or No     Current Address:     Phone#:     Best time to receive a call: Insurance Carrier:      Policy/ID#: Group#: Insurance Services Phone#:      What is your current presenting problem? Open to virtual talk therapy: Yes or No      We will call you to do an Intake when an appointment becomes available. You can send this information back to us in any of the ways below:    Email: [email or web address]. Ryan Quintainlla  Fax#:  [phone]  Mail:   [address] [place] [address], [address] [place] [address]

## 2024-02-14 ENCOUNTER — OFFICE VISIT (OUTPATIENT)
Dept: GASTROENTEROLOGY | Facility: CLINIC | Age: 64
End: 2024-02-14
Payer: COMMERCIAL

## 2024-02-14 VITALS
DIASTOLIC BLOOD PRESSURE: 70 MMHG | HEART RATE: 111 BPM | TEMPERATURE: 98.2 F | RESPIRATION RATE: 20 BRPM | OXYGEN SATURATION: 98 % | SYSTOLIC BLOOD PRESSURE: 112 MMHG | HEIGHT: 67 IN | BODY MASS INDEX: 24.99 KG/M2 | WEIGHT: 159.2 LBS

## 2024-02-14 DIAGNOSIS — Z12.11 COLON CANCER SCREENING: ICD-10-CM

## 2024-02-14 DIAGNOSIS — R10.13 EPIGASTRIC PAIN: Primary | ICD-10-CM

## 2024-02-14 DIAGNOSIS — K22.70 BARRETT'S ESOPHAGUS WITHOUT DYSPLASIA: ICD-10-CM

## 2024-02-14 DIAGNOSIS — Z72.0 TOBACCO USE: ICD-10-CM

## 2024-02-14 DIAGNOSIS — Z79.1 NSAID LONG-TERM USE: ICD-10-CM

## 2024-02-14 DIAGNOSIS — R63.4 WEIGHT LOSS: ICD-10-CM

## 2024-02-14 PROBLEM — K22.719 BARRETT'S ESOPHAGUS WITH DYSPLASIA: Status: ACTIVE | Noted: 2019-05-29

## 2024-02-14 PROBLEM — M54.6 CHRONIC BILATERAL THORACIC BACK PAIN: Status: ACTIVE | Noted: 2022-08-02

## 2024-02-14 PROBLEM — G89.29 CHRONIC BILATERAL THORACIC BACK PAIN: Status: ACTIVE | Noted: 2022-08-02

## 2024-02-14 PROBLEM — F31.9 BIPOLAR DEPRESSION (HCC): Status: ACTIVE | Noted: 2019-05-29

## 2024-02-14 PROCEDURE — 99204 OFFICE O/P NEW MOD 45 MIN: CPT | Performed by: STUDENT IN AN ORGANIZED HEALTH CARE EDUCATION/TRAINING PROGRAM

## 2024-02-14 RX ORDER — PANTOPRAZOLE SODIUM 40 MG/1
40 TABLET, DELAYED RELEASE ORAL 2 TIMES DAILY
COMMUNITY
Start: 2023-11-27 | End: 2024-02-14

## 2024-02-14 RX ORDER — OMEPRAZOLE 40 MG/1
40 CAPSULE, DELAYED RELEASE ORAL 2 TIMES DAILY
Qty: 60 CAPSULE | Refills: 11 | Status: SHIPPED | OUTPATIENT
Start: 2024-02-14

## 2024-02-14 RX ORDER — SUCRALFATE ORAL 1 G/10ML
1 SUSPENSION ORAL 4 TIMES DAILY PRN
Qty: 828 ML | Refills: 2 | Status: SHIPPED | OUTPATIENT
Start: 2024-02-14

## 2024-02-14 RX ORDER — GABAPENTIN 600 MG/1
600 TABLET ORAL 3 TIMES DAILY
COMMUNITY
Start: 2023-11-27

## 2024-02-14 NOTE — PROGRESS NOTES
[facility] - Outpatient Consultation  Bridget Laguna 63 y.o. female MRN: [id number]  Encounter: [id number]          ASSESSMENT AND PLAN:    63-year-old female with tobacco use, COPD, bipolar, Steward's here for several months of postprandial abdominal pain and weight loss.  Given her symptoms and significant ibuprofen use, I am concerned for PUD versus gastritis.  Will escalate acid suppression and check EGD.  If this is unrevealing, may warrant imaging of her abdominal vasculature given her postprandial pain, weight loss and tobacco use.  1. Epigastric pain  2. Weight loss  3. NSAID long-term use  - omeprazole (PriLOSEC) 40 MG capsule; Take 1 capsule (40 mg total) by mouth 2 (two) times a day  Dispense: 60 capsule; Refill: 11  - sucralfate (CARAFATE) 1 g/10 mL suspension; Take 10 mL (1 g total) by mouth 4 (four) times a day as needed (abdominal)  Dispense: 828 mL; Refill: 2  - EGD; Future  -Minimize NSAID use as much as able    4. Tobacco use  Has cut down from 2 packs/day to 1 pack/day.  Encouraged her to continue to cut down on tobacco use or ideally stop completely.    5. Colon cancer screening  She reports recent colonoscopy.  Will request records to determine when she is due for repeat screening.    6. Steward's esophagus without dysplasia  Stop pantoprazole twice a day and transition to omeprazole twice a day.    ______________________________________________________________________    HPI:    Has been having stomach pain for past few months that's gotten progressively worse. Pain is epigastric and LUQ. Pain is always there as a 3 and then goes up to 10 with eating. Doesn't matter what she eats.  Nothing makes pain  better. Takes ibuprofen every day for years.  Takes 800 mg twice a day.  Has lost 25#    Taking protonix twice a day ever since diagnosis of Steward's    Gets intermittent diarrhea and constipation.  No blood in stool.    Smokes 1PPD. No alcohol.    2/2/22  Pathology  ESOPHAGUS, DISTAL, BIOPSY:   Gastroesophageal mucosa with intestinal metaplasia, compatible with   Fernández's mucosa/esophagus in the appropriate clinical setting.   No dysplasia seen.     EGD 12/5/19  Esophagus: The upper and middle thirds of the esophagus appeared normal. The distal third of the esophagus appeared to have 2 tongues of fernández's esophagus. The Z-Line was visualized at 40 cm from the incisors.Biopsies taken with cold forceps.   Stomach: On retroflexed view, the cardia and fundus revealed a 1cm hiatal hernia. There was a Hill grade 1 flap valve. The body of the stomach demonstrated an erythematous appearance consistent with non-erosive gastritis. The antrum demonstrated an erythematous appearance consistent with non-erosive gastritis. Biopsies were obtained with cold forceps from the body, incisura, and antrum to assess for the presence of H.pylori.   Duodenum: The duodenal bulb appeared normal. The second portion of the duodenum appeared normal.   A. STOMACH, BIOPSY:   Reactive gastropathy (chemical gastritis).   No histologic changes of H. pylori gastritis are seen.     B. ESOPHAGUS, DISTAL, BIOPSY:   Squamocolumnar mucosa with intestinal metaplasia.   Negative for dysplasia.     REVIEW OF SYSTEMS:    CONSTITUTIONAL: Denies any fever, chills, rigors, and weight loss.  HEENT: No earache or tinnitus. Denies hearing loss or visual disturbances.  CARDIOVASCULAR: No chest pain or palpitations.   RESPIRATORY: Denies any cough, hemoptysis, shortness of breath or dyspnea on exertion.  GASTROINTESTINAL: As noted in the History of Present Illness.   GENITOURINARY: No problems with urination. Denies any hematuria or dysuria.  NEUROLOGIC: No dizziness or vertigo, denies headaches.   MUSCULOSKELETAL: Denies any muscle or joint pain.   SKIN: Denies skin rashes or itching.   ENDOCRINE: Denies excessive thirst. Denies intolerance to heat or cold.  PSYCHOSOCIAL: Denies depression or anxiety. Denies any  "recent memory loss.       Historical Information   Past Medical History:   Diagnosis Date    Avascular necrosis (HCC)     Steward's esophagus     Cancer (HCC)     COPD (chronic obstructive pulmonary disease) (HCC)     Factor 5 Leiden mutation, heterozygous (HCC)     Hyperlipidemia      Past Surgical History:   Procedure Laterality Date    CHOLECYSTECTOMY      TONSILLECTOMY       Social History   Social History     Substance and Sexual Activity   Alcohol Use Not Currently     Social History     Substance and Sexual Activity   Drug Use Not Currently     Social History     Tobacco Use   Smoking Status Every Day    Current packs/day: 1.00    Types: Cigarettes   Smokeless Tobacco Never     No family history on file.    Meds/Allergies       Current Outpatient Medications:     albuterol (2.5 mg/3 mL) 0.083 % nebulizer solution    albuterol (PROVENTIL HFA,VENTOLIN HFA) 90 mcg/act inhaler    ALPRAZolam (XANAX) 1 mg tablet    aspirin (ECOTRIN LOW STRENGTH) 81 mg EC tablet    clonazePAM (KlonoPIN) 1 mg tablet    omeprazole (PriLOSEC OTC) 20 MG tablet    ondansetron (Zofran ODT) 4 mg disintegrating tablet    PARoxetine (PAXIL) 40 MG tablet    predniSONE 50 mg tablet    QUEtiapine (SEROquel) 400 MG tablet    tiotropium (SPIRIVA) 18 mcg inhalation capsule    methylPREDNISolone 4 MG tablet therapy pack    sucralfate (CARAFATE) 1 g tablet    No Known Allergies        Objective     Blood pressure 112/70, pulse (!) 111, temperature 98.2 °F (36.8 °C), resp. rate 20, height 5' 7\" (1.702 m), weight 72.2 kg (159 lb 3.2 oz), SpO2 98%. Body mass index is 24.93 kg/m².        PHYSICAL EXAM:      General Appearance:   Alert, cooperative, no distress   HEENT:   Normocephalic, atraumatic, anicteric.     Neck:  Supple, symmetrical, trachea midline   Lungs:   Clear to auscultation bilaterally; no rales, rhonchi or wheezing; respirations unlabored    Heart::   Regular rate and rhythm; no murmur, rub, or gallop.   Abdomen:   Soft, non-tender, " non-distended; normal bowel sounds; no masses, no organomegaly    Genitalia:   Deferred    Rectal:   Deferred    Extremities:  No cyanosis, clubbing or edema    Pulses:  2+ and symmetric    Skin:  No jaundice, rashes, or lesions    Lymph nodes:  No palpable cervical lymphadenopathy        Lab Results:   No visits with results within 1 Day(s) from this visit.   Latest known visit with results is:   Admission on 11/26/2022, Discharged on 11/26/2022   Component Date Value    WBC 11/26/2022 5.02     RBC 11/26/2022 4.13     Hemoglobin 11/26/2022 12.9     Hematocrit 11/26/2022 37.7     MCV 11/26/2022 91     MCH 11/26/2022 31.2     MCHC 11/26/2022 34.2     RDW 11/26/2022 13.2     MPV 11/26/2022 8.4 (L)     Platelets 11/26/2022 139 (L)     nRBC 11/26/2022 0     Neutrophils Relative 11/26/2022 42 (L)     Immat GRANS % 11/26/2022 0     Lymphocytes Relative 11/26/2022 54 (H)     Monocytes Relative 11/26/2022 4     Eosinophils Relative 11/26/2022 0     Basophils Relative 11/26/2022 0     Neutrophils Absolute 11/26/2022 2.09     Immature Grans Absolute 11/26/2022 0.01     Lymphocytes Absolute 11/26/2022 2.70     Monocytes Absolute 11/26/2022 0.20     Eosinophils Absolute 11/26/2022 0.00     Basophils Absolute 11/26/2022 0.02     Protime 11/26/2022 14.7 (H)     INR 11/26/2022 1.12     PTT 11/26/2022 42 (H)     Sodium 11/26/2022 137     Potassium 11/26/2022 3.5     Chloride 11/26/2022 104     CO2 11/26/2022 23     ANION GAP 11/26/2022 10     BUN 11/26/2022 16     Creatinine 11/26/2022 1.17     Glucose 11/26/2022 107     Calcium 11/26/2022 9.0     AST 11/26/2022      ALT 11/26/2022 35     Alkaline Phosphatase 11/26/2022 76     Total Protein 11/26/2022 7.4     Albumin 11/26/2022 3.9     Total Bilirubin 11/26/2022 0.48     eGFR 11/26/2022 50     Magnesium 11/26/2022 2.1     Lipase 11/26/2022 48 (L)     LACTIC ACID 11/26/2022 0.3 (L)     hs TnI 0hr 11/26/2022 <2     Color, UA 11/26/2022 Yellow     Clarity, UA 11/26/2022 Clear      Specific Gravity, UA 11/26/2022 <=1.005     pH, UA 11/26/2022 6.5     Leukocytes, UA 11/26/2022 Trace (A)     Nitrite, UA 11/26/2022 Negative     Protein, UA 11/26/2022 Negative     Glucose, UA 11/26/2022 Negative     Ketones, UA 11/26/2022 Negative     Urobilinogen, UA 11/26/2022 0.2     Bilirubin, UA 11/26/2022 Negative     Occult Blood, UA 11/26/2022 Negative     SARS-CoV-2 11/26/2022 Negative     INFLUENZA A PCR 11/26/2022 Negative     INFLUENZA B PCR 11/26/2022 Negative     RSV PCR 11/26/2022 Negative     Ventricular Rate 11/26/2022 77     Atrial Rate 11/26/2022 77     DE Interval 11/26/2022 158     QRSD Interval 11/26/2022 90     QT Interval 11/26/2022 394     QTC Interval 11/26/2022 445     P Axis 11/26/2022 53     QRS Axis 11/26/2022 28     T Wave [place] 11/26/2022 49     hs TnI 2hr 11/26/2022 2     Delta 2hr hsTnI 11/26/2022 >0     RBC, UA 11/26/2022 None Seen     WBC, UA 11/26/2022 1-2     Epithelial Cells 11/26/2022 Occasional     Bacteria, UA 11/26/2022 Occasional          Radiology Results:   No results found.

## 2024-02-19 ENCOUNTER — HOSPITAL ENCOUNTER (OUTPATIENT)
Dept: GASTROENTEROLOGY | Facility: HOSPITAL | Age: 64
Setting detail: OUTPATIENT SURGERY
Discharge: HOME | End: 2024-02-19
Attending: STUDENT IN AN ORGANIZED HEALTH CARE EDUCATION/TRAINING PROGRAM
Payer: COMMERCIAL

## 2024-02-19 ENCOUNTER — ANESTHESIA (OUTPATIENT)
Dept: GASTROENTEROLOGY | Facility: HOSPITAL | Age: 64
End: 2024-02-19

## 2024-02-19 ENCOUNTER — ANESTHESIA EVENT (OUTPATIENT)
Dept: GASTROENTEROLOGY | Facility: HOSPITAL | Age: 64
End: 2024-02-19

## 2024-02-19 VITALS
DIASTOLIC BLOOD PRESSURE: 65 MMHG | WEIGHT: 159 LBS | HEIGHT: 67 IN | SYSTOLIC BLOOD PRESSURE: 111 MMHG | OXYGEN SATURATION: 98 % | RESPIRATION RATE: 20 BRPM | TEMPERATURE: 97.2 F | BODY MASS INDEX: 24.96 KG/M2 | HEART RATE: 101 BPM

## 2024-02-19 DIAGNOSIS — R10.13 EPIGASTRIC PAIN: ICD-10-CM

## 2024-02-19 DIAGNOSIS — R63.4 WEIGHT LOSS: ICD-10-CM

## 2024-02-19 PROCEDURE — 43239 EGD BIOPSY SINGLE/MULTIPLE: CPT | Performed by: STUDENT IN AN ORGANIZED HEALTH CARE EDUCATION/TRAINING PROGRAM

## 2024-02-19 PROCEDURE — 88305 TISSUE EXAM BY PATHOLOGIST: CPT | Performed by: STUDENT IN AN ORGANIZED HEALTH CARE EDUCATION/TRAINING PROGRAM

## 2024-02-19 RX ORDER — SODIUM CHLORIDE, SODIUM LACTATE, POTASSIUM CHLORIDE, CALCIUM CHLORIDE 600; 310; 30; 20 MG/100ML; MG/100ML; MG/100ML; MG/100ML
INJECTION, SOLUTION INTRAVENOUS CONTINUOUS PRN
Status: DISCONTINUED | OUTPATIENT
Start: 2024-02-19 | End: 2024-02-19

## 2024-02-19 RX ORDER — LIDOCAINE HYDROCHLORIDE 20 MG/ML
INJECTION, SOLUTION EPIDURAL; INFILTRATION; INTRACAUDAL; PERINEURAL AS NEEDED
Status: DISCONTINUED | OUTPATIENT
Start: 2024-02-19 | End: 2024-02-19

## 2024-02-19 RX ORDER — PROPOFOL 10 MG/ML
INJECTION, EMULSION INTRAVENOUS AS NEEDED
Status: DISCONTINUED | OUTPATIENT
Start: 2024-02-19 | End: 2024-02-19

## 2024-02-19 RX ADMIN — PROPOFOL 100 MG: 10 INJECTION, EMULSION INTRAVENOUS at 07:55

## 2024-02-19 RX ADMIN — LIDOCAINE HYDROCHLORIDE 60 MG: 20 INJECTION, SOLUTION EPIDURAL; INFILTRATION; INTRACAUDAL; PERINEURAL at 07:55

## 2024-02-19 RX ADMIN — PROPOFOL 140 MCG/KG/MIN: 10 INJECTION, EMULSION INTRAVENOUS at 07:56

## 2024-02-19 RX ADMIN — SODIUM CHLORIDE, SODIUM LACTATE, POTASSIUM CHLORIDE, AND CALCIUM CHLORIDE: .6; .31; .03; .02 INJECTION, SOLUTION INTRAVENOUS at 07:36

## 2024-02-19 NOTE — ANESTHESIA POSTPROCEDURE EVALUATION
Post-Op Assessment Note    CV Status:  Stable  Pain Score: 0    Pain management: adequate       Mental Status:  Arousable   Hydration Status:  Stable   PONV Controlled:  None   Airway Patency:  Patent     Post Op Vitals Reviewed: Yes    No anethesia notable event occurred.    Staff: CRNA               BP   96/63   Temp   99   Pulse  93   Resp   16   SpO2   98%

## 2024-02-19 NOTE — ANESTHESIA PREPROCEDURE EVALUATION
Procedure:  EGD    Relevant Problems   CARDIO   (+) Chronic bilateral thoracic back pain      HEMATOLOGY   (+) Factor V deficiency, congenital (HCC)      MUSCULOSKELETAL   (+) Chronic bilateral thoracic back pain      NEURO/PSYCH   (+) Chronic bilateral thoracic back pain      PULMONARY   (+) Chronic airway obstruction (HCC)      Past Medical History:   Diagnosis Date    Avascular necrosis (HCC)     Steward's esophagus     Cancer (HCC)     COPD (chronic obstructive pulmonary disease) (HCC)     Factor 5 Leiden mutation, heterozygous (HCC)     Hyperlipidemia          Physical Exam    Airway    Mallampati score: II  TM Distance: >3 FB  Neck ROM: full     Dental        Cardiovascular  Rhythm: regular, Rate: normal    Pulmonary   Breath sounds clear to auscultation    Other Findings  Intercisor Distance > 3cm    post-pubertal.      Anesthesia Plan  ASA Score- 3     Anesthesia Type- IV sedation with anesthesia with ASA Monitors.         Additional Monitors:     Airway Plan:     Comment: NPO appropriate. Discussed benefits/risks of monitored anesthetic care which involves providing a dynamic level of mild to deep sedation. Complications include awareness and/or airway obstruction/aspiration which may necessitate conversion to general anesthesia. All questions answered. Patient understands and wishes to proceed. .       Plan Factors-Exercise tolerance (METS): >4 METS.    Chart reviewed. EKG reviewed.  Existing labs reviewed.                   Induction-     Postoperative Plan- Plan for postoperative opioid use.     Informed Consent- Anesthetic plan and risks discussed with patient.  I personally reviewed this patient with the CRNA. Discussed and agreed on the Anesthesia Plan with the CRNA..

## 2024-02-19 NOTE — H&P
History and Physical - [facility]  Bridget Laguna 63 y.o. female MRN: [id number]                  HPI: Bridget Laguna is a 63 y.o. year old female who presents for weight loss, epigastric pain.      REVIEW OF SYSTEMS: Per the HPI, and otherwise unremarkable.    Historical Information   Past Medical History:   Diagnosis Date    Avascular necrosis (HCC)     Steward's esophagus     Cancer (HCC)     COPD (chronic obstructive pulmonary disease) (HCC)     Factor 5 Leiden mutation, heterozygous (HCC)     Hyperlipidemia      Past Surgical History:   Procedure Laterality Date    CHOLECYSTECTOMY      TONSILLECTOMY       Social History   Social History     Substance and Sexual Activity   Alcohol Use Not Currently     Social History     Substance and Sexual Activity   Drug Use Not Currently     Social History     Tobacco Use   Smoking Status Every Day    Current packs/day: 1.00    Types: Cigarettes   Smokeless Tobacco Never     No family history on file.    Meds/Allergies       Current Outpatient Medications:     albuterol (2.5 mg/3 mL) 0.083 % nebulizer solution    albuterol (PROVENTIL HFA,VENTOLIN HFA) 90 mcg/act inhaler    clonazePAM (KlonoPIN) 1 mg tablet    gabapentin (NEURONTIN) 600 MG tablet    omeprazole (PriLOSEC) 40 MG capsule    PARoxetine (PAXIL) 40 MG tablet    QUEtiapine (SEROquel) 400 MG tablet    sucralfate (CARAFATE) 1 g/10 mL suspension    tiotropium (SPIRIVA) 18 mcg inhalation capsule    No Known Allergies    Objective     There were no vitals taken for this visit.      PHYSICAL EXAM    Gen: NAD  Head: NCAT  CV: RRR  CHEST: Clear  ABD: soft, NT/ND  EXT: no edema      ASSESSMENT/PLAN:  This is a 63 y.o. year old female here for EGD, and she is stable and optimized for her procedure.

## 2024-02-21 PROCEDURE — 88305 TISSUE EXAM BY PATHOLOGIST: CPT | Performed by: STUDENT IN AN ORGANIZED HEALTH CARE EDUCATION/TRAINING PROGRAM

## 2024-02-23 ENCOUNTER — NURSE TRIAGE (OUTPATIENT)
Age: 64
End: 2024-02-23

## 2024-02-23 ENCOUNTER — TELEPHONE (OUTPATIENT)
Age: 64
End: 2024-02-23

## 2024-02-23 ENCOUNTER — APPOINTMENT (EMERGENCY)
Dept: CT IMAGING | Facility: HOSPITAL | Age: 64
End: 2024-02-23
Payer: COMMERCIAL

## 2024-02-23 ENCOUNTER — HOSPITAL ENCOUNTER (EMERGENCY)
Facility: HOSPITAL | Age: 64
Discharge: HOME | End: 2024-02-23
Attending: EMERGENCY MEDICINE
Payer: COMMERCIAL

## 2024-02-23 VITALS
RESPIRATION RATE: 18 BRPM | DIASTOLIC BLOOD PRESSURE: 67 MMHG | OXYGEN SATURATION: 97 % | HEART RATE: 95 BPM | SYSTOLIC BLOOD PRESSURE: 135 MMHG | TEMPERATURE: 97.2 F

## 2024-02-23 DIAGNOSIS — K59.00 CONSTIPATION: ICD-10-CM

## 2024-02-23 DIAGNOSIS — N39.0 UTI (URINARY TRACT INFECTION): Primary | ICD-10-CM

## 2024-02-23 LAB
ALBUMIN SERPL BCP-MCNC: 4.5 G/DL (ref 3.5–5)
ALP SERPL-CCNC: 72 U/L (ref 34–104)
ALT SERPL W P-5'-P-CCNC: 9 U/L (ref 7–52)
ANION GAP SERPL CALCULATED.3IONS-SCNC: 6 MMOL/L
AST SERPL W P-5'-P-CCNC: 11 U/L (ref 13–39)
ATRIAL RATE: 79 BPM
BACTERIA UR QL AUTO: ABNORMAL /HPF
BASOPHILS # BLD AUTO: 0.03 THOUSANDS/ÂΜL (ref 0–0.1)
BASOPHILS NFR BLD AUTO: 0 % (ref 0–1)
BILIRUB SERPL-MCNC: 0.37 MG/DL (ref 0.2–1)
BILIRUB UR QL STRIP: NEGATIVE
BUN SERPL-MCNC: 16 MG/DL (ref 5–25)
CALCIUM SERPL-MCNC: 9.3 MG/DL (ref 8.4–10.2)
CARDIAC TROPONIN I PNL SERPL HS: <2 NG/L
CHLORIDE SERPL-SCNC: 106 MMOL/L (ref 96–108)
CLARITY UR: CLEAR
CO2 SERPL-SCNC: 27 MMOL/L (ref 21–32)
COLOR UR: YELLOW
CREAT SERPL-MCNC: 1.05 MG/DL (ref 0.6–1.3)
EOSINOPHIL # BLD AUTO: 0 THOUSAND/ÂΜL (ref 0–0.61)
EOSINOPHIL NFR BLD AUTO: 0 % (ref 0–6)
ERYTHROCYTE [DISTWIDTH] IN BLOOD BY AUTOMATED COUNT: 13.2 % (ref 11.6–15.1)
GFR SERPL CREATININE-BSD FRML MDRD: 56 ML/MIN/1.73SQ M
GLUCOSE SERPL-MCNC: 83 MG/DL (ref 65–140)
GLUCOSE UR STRIP-MCNC: NEGATIVE MG/DL
HCT VFR BLD AUTO: 37 % (ref 34.8–46.1)
HGB BLD-MCNC: 12.1 G/DL (ref 11.5–15.4)
HGB UR QL STRIP.AUTO: NEGATIVE
IMM GRANULOCYTES # BLD AUTO: 0.01 THOUSAND/UL (ref 0–0.2)
IMM GRANULOCYTES NFR BLD AUTO: 0 % (ref 0–2)
KETONES UR STRIP-MCNC: NEGATIVE MG/DL
LEUKOCYTE ESTERASE UR QL STRIP: ABNORMAL
LIPASE SERPL-CCNC: 38 U/L (ref 11–82)
LYMPHOCYTES # BLD AUTO: 4.05 THOUSANDS/ÂΜL (ref 0.6–4.47)
LYMPHOCYTES NFR BLD AUTO: 55 % (ref 14–44)
MCH RBC QN AUTO: 30.6 PG (ref 26.8–34.3)
MCHC RBC AUTO-ENTMCNC: 32.7 G/DL (ref 31.4–37.4)
MCV RBC AUTO: 94 FL (ref 82–98)
MONOCYTES # BLD AUTO: 0.35 THOUSAND/ÂΜL (ref 0.17–1.22)
MONOCYTES NFR BLD AUTO: 5 % (ref 4–12)
NEUTROPHILS # BLD AUTO: 2.95 THOUSANDS/ÂΜL (ref 1.85–7.62)
NEUTS SEG NFR BLD AUTO: 40 % (ref 43–75)
NITRITE UR QL STRIP: NEGATIVE
NON-SQ EPI CELLS URNS QL MICRO: ABNORMAL /HPF
NRBC BLD AUTO-RTO: 0 /100 WBCS
OTHER STN SPEC: ABNORMAL
P AXIS: 69 DEGREES
PH UR STRIP.AUTO: 6 [PH]
PLATELET # BLD AUTO: 207 THOUSANDS/UL (ref 149–390)
PMV BLD AUTO: 8.5 FL (ref 8.9–12.7)
POTASSIUM SERPL-SCNC: 3.7 MMOL/L (ref 3.5–5.3)
PR INTERVAL: 166 MS
PROT SERPL-MCNC: 7.3 G/DL (ref 6.4–8.4)
PROT UR STRIP-MCNC: ABNORMAL MG/DL
QRS AXIS: 39 DEGREES
QRSD INTERVAL: 82 MS
QT INTERVAL: 400 MS
QTC INTERVAL: 458 MS
RBC # BLD AUTO: 3.95 MILLION/UL (ref 3.81–5.12)
RBC #/AREA URNS AUTO: ABNORMAL /HPF
SODIUM SERPL-SCNC: 139 MMOL/L (ref 135–147)
SP GR UR STRIP.AUTO: >=1.03 (ref 1–1.03)
T WAVE AXIS: 62 DEGREES
UROBILINOGEN UR QL STRIP.AUTO: 0.2 E.U./DL
VENTRICULAR RATE: 79 BPM
WBC # BLD AUTO: 7.39 THOUSAND/UL (ref 4.31–10.16)
WBC #/AREA URNS AUTO: ABNORMAL /HPF

## 2024-02-23 PROCEDURE — 99285 EMERGENCY DEPT VISIT HI MDM: CPT | Performed by: PHYSICIAN ASSISTANT

## 2024-02-23 PROCEDURE — 96375 TX/PRO/DX INJ NEW DRUG ADDON: CPT

## 2024-02-23 PROCEDURE — 74177 CT ABD & PELVIS W/CONTRAST: CPT

## 2024-02-23 PROCEDURE — 93005 ELECTROCARDIOGRAM TRACING: CPT

## 2024-02-23 PROCEDURE — 99284 EMERGENCY DEPT VISIT MOD MDM: CPT

## 2024-02-23 PROCEDURE — 84484 ASSAY OF TROPONIN QUANT: CPT | Performed by: PHYSICIAN ASSISTANT

## 2024-02-23 PROCEDURE — 85025 COMPLETE CBC W/AUTO DIFF WBC: CPT | Performed by: PHYSICIAN ASSISTANT

## 2024-02-23 PROCEDURE — 87086 URINE CULTURE/COLONY COUNT: CPT | Performed by: PHYSICIAN ASSISTANT

## 2024-02-23 PROCEDURE — 80053 COMPREHEN METABOLIC PANEL: CPT | Performed by: PHYSICIAN ASSISTANT

## 2024-02-23 PROCEDURE — 83690 ASSAY OF LIPASE: CPT | Performed by: PHYSICIAN ASSISTANT

## 2024-02-23 PROCEDURE — 36415 COLL VENOUS BLD VENIPUNCTURE: CPT | Performed by: PHYSICIAN ASSISTANT

## 2024-02-23 PROCEDURE — 96361 HYDRATE IV INFUSION ADD-ON: CPT

## 2024-02-23 PROCEDURE — 81001 URINALYSIS AUTO W/SCOPE: CPT | Performed by: PHYSICIAN ASSISTANT

## 2024-02-23 PROCEDURE — 96374 THER/PROPH/DIAG INJ IV PUSH: CPT

## 2024-02-23 RX ORDER — ONDANSETRON 2 MG/ML
4 INJECTION INTRAMUSCULAR; INTRAVENOUS ONCE
Status: COMPLETED | OUTPATIENT
Start: 2024-02-23 | End: 2024-02-23

## 2024-02-23 RX ORDER — CEPHALEXIN 500 MG/1
500 CAPSULE ORAL EVERY 6 HOURS SCHEDULED
Qty: 28 CAPSULE | Refills: 0 | Status: SHIPPED | OUTPATIENT
Start: 2024-02-23 | End: 2024-03-01

## 2024-02-23 RX ORDER — MORPHINE SULFATE 4 MG/ML
4 INJECTION, SOLUTION INTRAMUSCULAR; INTRAVENOUS ONCE
Status: COMPLETED | OUTPATIENT
Start: 2024-02-23 | End: 2024-02-23

## 2024-02-23 RX ORDER — CEPHALEXIN 250 MG/1
500 CAPSULE ORAL ONCE
Status: COMPLETED | OUTPATIENT
Start: 2024-02-23 | End: 2024-02-23

## 2024-02-23 RX ADMIN — CEPHALEXIN 500 MG: 250 CAPSULE ORAL at 20:11

## 2024-02-23 RX ADMIN — SODIUM CHLORIDE 1000 ML: 0.9 INJECTION, SOLUTION INTRAVENOUS at 18:13

## 2024-02-23 RX ADMIN — IOHEXOL 100 ML: 350 INJECTION, SOLUTION INTRAVENOUS at 18:51

## 2024-02-23 RX ADMIN — ONDANSETRON 4 MG: 2 INJECTION INTRAMUSCULAR; INTRAVENOUS at 19:39

## 2024-02-23 RX ADMIN — MORPHINE SULFATE 4 MG: 4 INJECTION, SOLUTION INTRAMUSCULAR; INTRAVENOUS at 19:40

## 2024-02-23 NOTE — ED PROVIDER NOTES
History  Chief Complaint   Patient presents with    Abdominal Pain     EGD done on Monday, having upper abdominal pain since Tuesday has gotten worse since, complains of nausea. Pain worse after eating or drinking.      This is a 63-year-old female who is here for evaluation of epigastric pain sent in by GI.  She had upper endoscopy completed on Monday of this week which is 4 days ago, she stated postop day 1 she was doing well however since that point she has not severe epigastric pain.  She has fear of eating or drinking secondary to the pain she has had some diarrhea she also admits to urinary frequency denies fevers.  No constipation denies any black or red stool.  The reason for the EGD was epigastric pain.  Prior surgical history positive for cholecystectomy        Prior to Admission Medications   Prescriptions Last Dose Informant Patient Reported? Taking?   PARoxetine (PAXIL) 40 MG tablet   Yes No   Sig: Take 40 mg by mouth daily   QUEtiapine (SEROquel) 400 MG tablet   Yes No   Sig: Take 800 mg by mouth   albuterol (2.5 mg/3 mL) 0.083 % nebulizer solution   No No   Sig: Take 1 vial (2.5 mg total) by nebulization every 6 (six) hours as needed for wheezing or shortness of breath for up to 30 doses   albuterol (PROVENTIL HFA,VENTOLIN HFA) 90 mcg/act inhaler   Yes No   Sig: Inhale   clonazePAM (KlonoPIN) 1 mg tablet   Yes No   Sig: Take 1 mg by mouth 3 (three) times a day   gabapentin (NEURONTIN) 600 MG tablet   Yes No   Sig: Take 600 mg by mouth 3 (three) times a day   omeprazole (PriLOSEC) 40 MG capsule   No No   Sig: Take 1 capsule (40 mg total) by mouth 2 (two) times a day   sucralfate (CARAFATE) 1 g/10 mL suspension   No No   Sig: Take 10 mL (1 g total) by mouth 4 (four) times a day as needed (abdominal)   tiotropium (SPIRIVA) 18 mcg inhalation capsule   Yes No   Sig: Place 18 mcg into inhaler and inhale daily      Facility-Administered Medications: None       Past Medical History:   Diagnosis Date     Avascular necrosis (HCC)     Steward's esophagus     Cancer (HCC)     basal cell    COPD (chronic obstructive pulmonary disease) (HCC)     Factor 5 Leiden mutation, heterozygous (HCC)     Hyperlipidemia        Past Surgical History:   Procedure Laterality Date    CHOLECYSTECTOMY      EGD      TONSILLECTOMY         History reviewed. No pertinent family history.  I have reviewed and agree with the history as documented.    E-Cigarette/Vaping    E-Cigarette Use Never User      E-Cigarette/Vaping Substances     Social History     Tobacco Use    Smoking status: Every Day     Current packs/day: 1.00     Types: Cigarettes    Smokeless tobacco: Never   Vaping Use    Vaping status: Never Used   Substance Use Topics    Alcohol use: Not Currently    Drug use: Not Currently       Review of Systems   Constitutional:  Negative for chills and fever.   HENT:  Negative for ear pain and sore throat.    Eyes:  Negative for pain and visual disturbance.   Respiratory:  Negative for cough and shortness of breath.    Cardiovascular:  Negative for chest pain and palpitations.   Gastrointestinal:  Positive for abdominal pain, diarrhea and nausea. Negative for vomiting.   Genitourinary:  Positive for frequency. Negative for dysuria and hematuria.   Musculoskeletal:  Negative for arthralgias and back pain.   Skin:  Negative for color change and rash.   Neurological:  Negative for seizures and syncope.   All other systems reviewed and are negative.      Physical Exam  Physical Exam  Vitals reviewed.   Constitutional:       General: She is not in acute distress.     Appearance: She is well-developed. She is not ill-appearing, toxic-appearing or diaphoretic.   HENT:      Right Ear: External ear normal. No swelling. Tympanic membrane is not bulging.      Left Ear: External ear normal. No swelling. Tympanic membrane is not bulging.      Nose: Nose normal.      Mouth/Throat:      Pharynx: No oropharyngeal exudate.   Eyes:      General: Lids are  normal.      Conjunctiva/sclera: Conjunctivae normal.      Pupils: Pupils are equal, round, and reactive to light.   Neck:      Thyroid: No thyromegaly.      Vascular: No JVD.      Trachea: No tracheal deviation.   Cardiovascular:      Rate and Rhythm: Normal rate and regular rhythm.      Pulses: Normal pulses.      Heart sounds: Normal heart sounds. No murmur heard.     No friction rub. No gallop.   Pulmonary:      Effort: Pulmonary effort is normal. No respiratory distress.      Breath sounds: Normal breath sounds. No stridor. No wheezing or rales.   Chest:      Chest wall: No tenderness.   Abdominal:      General: Bowel sounds are normal. There is no distension.      Palpations: Abdomen is soft. There is no mass.      Tenderness: There is abdominal tenderness in the epigastric area. There is no guarding or rebound. Negative signs include Byers's sign.      Hernia: No hernia is present.   Musculoskeletal:         General: No tenderness. Normal range of motion.      Cervical back: Normal range of motion and neck supple. No edema. Normal range of motion.   Lymphadenopathy:      Cervical: No cervical adenopathy.   Skin:     General: Skin is warm and dry.      Capillary Refill: Capillary refill takes less than 2 seconds.      Coloration: Skin is not pale.      Findings: No erythema or rash.   Neurological:      Mental Status: She is alert and oriented to person, place, and time.      GCS: GCS eye subscore is 4. GCS verbal subscore is 5. GCS motor subscore is 6.      Cranial Nerves: No cranial nerve deficit.      Sensory: No sensory deficit.      Deep Tendon Reflexes: Reflexes are normal and symmetric.   Psychiatric:         Speech: Speech normal.         Behavior: Behavior normal.         Vital Signs  ED Triage Vitals [02/23/24 1730]   Temperature Pulse Respirations Blood Pressure SpO2   (!) 97.2 °F (36.2 °C) 95 18 135/67 97 %      Temp Source Heart Rate Source Patient Position - Orthostatic VS BP Location FiO2 (%)    Temporal Monitor Sitting Right arm --      Pain Score       10 - Worst Possible Pain           Vitals:    02/23/24 1730   BP: 135/67   Pulse: 95   Patient Position - Orthostatic VS: Sitting         Visual Acuity      ED Medications  Medications   cephalexin (KEFLEX) capsule 500 mg (has no administration in time range)   sodium chloride 0.9 % bolus 1,000 mL (0 mL Intravenous Stopped 2/23/24 1913)   iohexol (OMNIPAQUE) 350 MG/ML injection (MULTI-DOSE) 100 mL (100 mL Intravenous Given 2/23/24 1851)   ondansetron (ZOFRAN) injection 4 mg (4 mg Intravenous Given 2/23/24 1939)   morphine injection 4 mg (4 mg Intravenous Given 2/23/24 1940)       Diagnostic Studies  Results Reviewed       Procedure Component Value Units Date/Time    HS Troponin 0hr (reflex protocol) [[id number]]  (Normal) Collected: 02/23/24 1811    Lab Status: Final result Specimen: Blood from Arm, Right Updated: 02/23/24 1841     hs TnI 0hr <2 ng/L     Comprehensive metabolic panel [[id number]]  (Abnormal) Collected: 02/23/24 1811    Lab Status: Final result Specimen: Blood from Arm, Right Updated: 02/23/24 1836     Sodium 139 mmol/L      Potassium 3.7 mmol/L      Chloride 106 mmol/L      CO2 27 mmol/L      ANION GAP 6 mmol/L      BUN 16 mg/dL      Creatinine 1.05 mg/dL      Glucose 83 mg/dL      Calcium 9.3 mg/dL      AST 11 U/L      ALT 9 U/L      Alkaline Phosphatase 72 U/L      Total Protein 7.3 g/dL      Albumin 4.5 g/dL      Total Bilirubin 0.37 mg/dL      eGFR 56 ml/min/1.73sq m     Narrative:      National Kidney Disease Foundation guidelines for Chronic Kidney Disease (CKD):     Stage 1 with normal or high GFR (GFR > 90 mL/min/1.73 square meters)    Stage 2 Mild CKD (GFR = 60-89 mL/min/1.73 square meters)    Stage 3A Moderate CKD (GFR = 45-59 mL/min/1.73 square meters)    Stage 3B Moderate CKD (GFR = 30-44 mL/min/1.73 square meters)    Stage 4 Severe CKD (GFR = 15-29 mL/min/1.73 square meters)    Stage 5 End Stage CKD (GFR <15 mL/min/1.73 square  meters)  Note: GFR calculation is accurate only with a steady state creatinine    Lipase [[id number]]  (Normal) Collected: 02/23/24 1811    Lab Status: Final result Specimen: Blood from Arm, Right Updated: 02/23/24 1836     Lipase 38 u/L     Urine Microscopic [[id number]]  (Abnormal) Collected: 02/23/24 1811    Lab Status: Final result Specimen: Urine, Clean Catch Updated: 02/23/24 1835     RBC, UA 0-1 /hpf      WBC, UA 10-20 /hpf      Epithelial Cells Occasional /hpf      Bacteria, UA Moderate /hpf      OTHER OBSERVATIONS Renal Epithelial Cells Present  Transitional Epithelial Cells  Glitter Cells    Urine culture [[id number]] Collected: 02/23/24 1811    Lab Status: In process Specimen: Urine, Clean Catch Updated: 02/23/24 1835    UA w Reflex to Microscopic w Reflex to Culture [[id number]]  (Abnormal) Collected: 02/23/24 1811    Lab Status: Final result Specimen: Urine, Clean Catch Updated: 02/23/24 1821     Color, UA Yellow     Clarity, UA Clear     Specific Gravity, UA >=1.030     pH, UA 6.0     Leukocytes, UA Trace     Nitrite, UA Negative     Protein, UA Trace mg/dl      Glucose, UA Negative mg/dl      Ketones, UA Negative mg/dl      Urobilinogen, UA 0.2 E.U./dl      Bilirubin, UA Negative     Occult Blood, UA Negative    CBC and differential [[id number]]  (Abnormal) Collected: 02/23/24 1811    Lab Status: Final result Specimen: Blood from Arm, Right Updated: 02/23/24 1819     WBC 7.39 Thousand/uL      RBC 3.95 Million/uL      Hemoglobin 12.1 g/dL      Hematocrit 37.0 %      MCV 94 fL      MCH 30.6 pg      MCHC 32.7 g/dL      RDW 13.2 %      MPV 8.5 fL      Platelets 207 Thousands/uL      nRBC 0 /100 WBCs      Neutrophils Relative 40 %      Immat GRANS % 0 %      Lymphocytes Relative 55 %      Monocytes Relative 5 %      Eosinophils Relative 0 %      Basophils Relative 0 %      Neutrophils Absolute 2.95 Thousands/µL      Immature Grans Absolute 0.01 Thousand/uL      Lymphocytes Absolute 4.05 Thousands/µL       Monocytes Absolute 0.35 Thousand/µL      Eosinophils Absolute 0.00 Thousand/µL      Basophils Absolute 0.03 Thousands/µL                    CT abdomen pelvis with contrast   Final Result by Thomas Bateman MD (02/23 1941)      1. Mild diffuse urinary bladder wall thickening, compatible with cystitis seen on urinalysis.      2. High density material in the distal stomach and proximal duodenum, presumably ingested. Correlate with oral intake or less likely hematemesis.      3. Moderately increased stool burden throughout the colon, suggesting constipation.         Workstation performed: [id number]                    Procedures  ECG 12 Lead Documentation Only    Date/Time: 2/23/2024 6:44 PM    Performed by: Thomas Jones PA-C  Authorized by: Thomas Jones PA-C    Indications / Diagnosis:  Epigastric pain  ECG reviewed by me, the ED Provider: yes    Patient location:  ED  Interpretation:     Interpretation: normal    Rate:     ECG rate:  79    ECG rate assessment: normal    Rhythm:     Rhythm: sinus rhythm    Ectopy:     Ectopy: none    QRS:     QRS axis:  Normal    QRS intervals:  Normal  Conduction:     Conduction: normal    ST segments:     ST segments:  Normal  T waves:     T waves: normal             ED Course  ED Course as of 02/23/24 2007 Fri Feb 23, 2024   1748 SpO2: 97 %   1748 Respirations: 18   1748 Temperature(!): 97.2 °F (36.2 °C)   1748 Blood Pressure: 135/67  Vital signs reviewed within normal limits   1957 IMPRESSION:     1. Mild diffuse urinary bladder wall thickening, compatible with cystitis seen on urinalysis.     2. High density material in the distal stomach and proximal duodenum, presumably ingested. Correlate with oral intake or less likely hematemesis.     3. Moderately increased stool burden throughout the colon, suggesting constipation.               HEART Risk Score      Flowsheet Row Most Recent Value   Heart Score Risk Calculator    History 0 Filed at: 02/23/2024 5783    ECG 0 Filed at: 02/23/2024 1845   Age 1 Filed at: 02/23/2024 1845   Risk Factors 1 Filed at: 02/23/2024 1845   Troponin 0 Filed at: 02/23/2024 1845   HEART Score 2 Filed at: 02/23/2024 1845                          SBIRT 22yo+      Flowsheet Row Most Recent Value   Initial Alcohol Screen: US AUDIT-C     1. How often do you have a drink containing alcohol? 0 Filed at: 02/23/2024 1732   2. How many drinks containing alcohol do you have on a typical day you are drinking?  0 Filed at: 02/23/2024 1732   3b. FEMALE Any Age, or MALE 65+: How often do you have 4 or more drinks on one occassion? 0 Filed at: 02/23/2024 1732   Audit-C Score 0 Filed at: 02/23/2024 1732   KONRAD: How many times in the past year have you...    Used an illegal drug or used a prescription medication for non-medical reasons? Never Filed at: 02/23/2024 1732                      Medical Decision Making  63-year-old female here for evaluation of epigastric pain 4 days after upper endoscopy.  Sent in by GI.  Also has had urinary frequency and diarrhea.  Differential diagnosis includes esophageal rupture, air in the abdomen, gastritis, appendicitis, bowel obstruction, UTI, enteritis.    Workup is significant for UTI and review of the urine as well as thickening of the urinary bladder on CT scan there are signs of constipation and some high density material in the stomach there is no history of hematemesis.  No evidence of perforation.  We will treat the UTI give recommendations regarding constipation and recommend follow-up with primary care/GI.    Amount and/or Complexity of Data Reviewed  Labs: ordered.  Radiology: ordered.    Risk  Prescription drug management.             Disposition  Final diagnoses:   UTI (urinary tract infection)   Constipation     Time reflects when diagnosis was documented in both MDM as applicable and the Disposition within this note       Time User Action Codes Description Comment    2/23/2024  6:42 PM Thomas Jones Add  [N39.0] UTI (urinary tract infection)     2/23/2024  8:03 PM Thomas Jones Add [K59.00] Constipation           ED Disposition       ED Disposition   Discharge    Condition   Stable    Date/Time   Fri Feb 23, 2024 2003    Comment   Bridget Laguna discharge to home/self care.                   Follow-up Information       Follow up With Specialties Details Why Contact Info    Nkiita Nolasco MD Internal Medicine Schedule an appointment as soon as possible for a visit  As needed [address] [place] [address] [phone]              Patient's Medications   Discharge Prescriptions    CEPHALEXIN (KEFLEX) 500 MG CAPSULE    Take 1 capsule (500 mg total) by mouth every 6 (six) hours for 7 days       Start Date: 2/23/2024 End Date: 3/1/2024       Order Dose: 500 mg       Quantity: 28 capsule    Refills: 0       No discharge procedures on file.    PDMP Review       None            ED Provider  Electronically Signed by             Thomas Jones PA-C  02/23/24 2007

## 2024-02-23 NOTE — TELEPHONE ENCOUNTER
Pt missed a call from the office - thinks it was regarding results from her EGD -- please reach back out to her.

## 2024-02-23 NOTE — TELEPHONE ENCOUNTER
Regarding: Nausea since procedure  ----- Message from Lashay Butler sent at 2/23/2024  3:11 PM EST -----  Patient recently had EGD done and is having pain in upper Abdomen and nausea

## 2024-02-23 NOTE — TELEPHONE ENCOUNTER
"Last ov 2/14/24 JOSÉ MANUEL Farr, Procedure EGD 2/19/24  IMPRESSION:  The upper third of the esophagus and middle third of the esophagus appeared normal. Performed random biopsy to rule out eosinophilic esophagitis.  [place]-colored mucosa with islands in the Z-line; performed cold forceps biopsy  The stomach appeared normal. Performed random biopsy to rule out H. pylori.  The duodenal bulb and 2nd part of the duodenum appeared normal. Performed random biopsy to rule out celiac disease.   RECOMMENDATION:    Await pathology results     Continue omeprazole twice daily  Call [phone] to schedule an ultrasound of the blood vessels in your abdomen     Patient states pain started day after EGD rates currently as severe and now nauseated. She is taking medications as ordered. I advised ER for evaluation. She needs to get a ride, I offered to call [phone] and she declined. She will report to [facility].    Reason for Disposition   SEVERE abdominal pain (e.g., excruciating)    Answer Assessment - Initial Assessment Questions  1. LOCATION: \"Where does it hurt?\"       Upper abdomen  2. RADIATION: \"Does the pain shoot anywhere else?\" (e.g., chest, back)      No just upper abdomen  3. ONSET: \"When did the pain begin?\" (e.g., minutes, hours or days ago)       Started day after procedure  4. SUDDEN: \"Gradual or sudden onset?\"      Pain worse since EGD  5. PATTERN \"Does the pain come and go, or is it constant?\"     - If constant: \"Is it getting better, staying the same, or worsening?\"       (Note: Constant means the pain never goes away completely; most serious pain is constant and it progresses)      - If intermittent: \"How long does it last?\" \"Do you have pain now?\"      (Note: Intermittent means the pain goes away completely between bouts)      There constantly and can't eat  6. SEVERITY: \"How bad is the pain?\"  (e.g., Scale 1-10; mild, moderate, or severe)     - MILD (1-3): doesn't interfere with normal activities, abdomen soft and not " "tender to touch      - MODERATE (4-7): interferes with normal activities or awakens from sleep, tender to touch      - SEVERE (8-10): excruciating pain, doubled over, unable to do any normal activities        severe  7. RECURRENT SYMPTOM: \"Have you ever had this type of stomach pain before?\" If Yes, ask: \"When was the last time?\" and \"What happened that time?\"       Yes but not this bad  8. AGGRAVATING FACTORS: \"Does anything seem to cause this pain?\" (e.g., foods, stress, alcohol)      No real appetite,   9. CARDIAC SYMPTOMS: \"Do you have any of the following symptoms: chest pain, difficulty breathing, sweating, nausea?\"      nausea  10. OTHER SYMPTOMS: \"Do you have any other symptoms?\" (e.g., fever, vomiting, diarrhea)        nausea  11. PREGNANCY: \"Is there any chance you are pregnant?\" \"When was your last menstrual period?\"        N/A    Protocols used: Abdominal Pain - Upper-ADULT-[place]    "

## 2024-02-24 LAB — BACTERIA UR CULT: NORMAL

## 2024-02-24 NOTE — DISCHARGE INSTRUCTIONS
BOWEL REGIMEN    MiraLAX (Polyethylene)  ----------------  3 times a day for 3 days, then once daily  Colace (docusate sodium) 100 mg ----1 pill twice a day  Citrucel (methylcellulose) ---------------- As directed on the bottle  Enemas or suppositories  ----------------ONLY As Directed on the package.  Drink plenty of water.  If you have been taking   narcotic pain medication you should add  Senokot: --------------------------------------2 tablets at bedtime

## 2024-02-26 LAB
ATRIAL RATE: 79 BPM
P AXIS: 69 DEGREES
PR INTERVAL: 166 MS
QRS AXIS: 39 DEGREES
QRSD INTERVAL: 82 MS
QT INTERVAL: 400 MS
QTC INTERVAL: 458 MS
T WAVE AXIS: 62 DEGREES
VENTRICULAR RATE: 79 BPM

## 2024-02-26 PROCEDURE — 93010 ELECTROCARDIOGRAM REPORT: CPT | Performed by: INTERNAL MEDICINE

## 2024-04-29 DIAGNOSIS — R63.4 WEIGHT LOSS: ICD-10-CM

## 2024-04-29 DIAGNOSIS — R10.13 EPIGASTRIC PAIN: ICD-10-CM

## 2024-04-30 RX ORDER — SUCRALFATE ORAL 1 G/10ML
SUSPENSION ORAL
Qty: 828 ML | Refills: 2 | Status: SHIPPED | OUTPATIENT
Start: 2024-04-30

## 2025-03-07 ENCOUNTER — TELEPHONE (OUTPATIENT)
Age: 65
End: 2025-03-07

## 2025-03-07 ENCOUNTER — NURSE TRIAGE (OUTPATIENT)
Age: 65
End: 2025-03-07

## 2025-03-07 NOTE — TELEPHONE ENCOUNTER
Patient called in with Symptoms. Patient was transferred over to ELIZABETH Higuera  for further assistance.

## 2025-03-07 NOTE — TELEPHONE ENCOUNTER
"Pt transferred to myself by Josseline.    FOLLOW UP: 04/02    REASON FOR CONVERSATION: Abdominal Pain    SYMPTOMS: Upper/generalized abdominal pain radiating to the back, 8-9/10. The Pain is constant. Intermittent nausea. Poor appetite. \"large rectal protrusion that goes in and out\" and rectal pain- pt does not believe this is a hemorrhoid.      OTHER: Symptoms have been ongoing for ~ 1 month. Pt states her PCP recommends EGD/colonoscopy. Pt declines ED evaluation- states she has been there 3 times. Urgent OV scheduled 04/02 with AGUSTÍN Francois.    DISPOSITION: Go to the ED now.    Reason for Disposition   SEVERE abdominal pain (e.g., excruciating)    Answer Assessment - Initial Assessment Questions  1. LOCATION: \"Where does it hurt?\"       Upper abdominal pain/generalized  2. RADIATION: \"Does the pain shoot anywhere else?\" (e.g., chest, back)      back  3. ONSET: \"When did the pain begin?\" (e.g., minutes, hours or days ago)       ~ 1 month  4. SUDDEN: \"Gradual or sudden onset?\"      Gradual   5. PATTERN \"Does the pain come and go, or is it constant?\"      constant  6. SEVERITY: \"How bad is the pain?\"  (e.g., Scale 1-10; mild, moderate, or severe)      8-9/10, constant  7. RECURRENT SYMPTOM: \"Have you ever had this type of stomach pain before?\" If Yes, ask: \"When was the last time?\" and \"What happened that time?\"       denies  8. CAUSE: \"What do you think is causing the stomach pain?\"      Unsure   9. RELIEVING/AGGRAVATING FACTORS: \"What makes it better or worse?\" (e.g., antacids, bending or twisting motion, bowel movement)      Hot bath makes slightly better  10. OTHER SYMPTOMS: \"Do you have any other symptoms?\" (e.g., back pain, diarrhea, fever, urination pain, vomiting)        Rectal protrusion, nausea, poor appetite, 7-8 pound loss   11. PREGNANCY: \"Is there any chance you are pregnant?\" \"When was your last menstrual period?\"        N/A    Protocols used: Abdominal Pain - Female-Adult-[place]    "

## 2025-03-17 RX ORDER — PANTOPRAZOLE SODIUM 40 MG/1
40 TABLET, DELAYED RELEASE ORAL DAILY
COMMUNITY
Start: 2025-03-06 | End: 2025-03-20

## 2025-03-17 RX ORDER — METHOCARBAMOL 500 MG/1
500 TABLET, FILM COATED ORAL 3 TIMES DAILY PRN
COMMUNITY
Start: 2025-02-14

## 2025-03-17 RX ORDER — DICYCLOMINE HCL 20 MG
20 TABLET ORAL 3 TIMES DAILY
COMMUNITY
Start: 2025-02-01 | End: 2025-03-20

## 2025-03-17 RX ORDER — ONDANSETRON 4 MG/1
4 TABLET, ORALLY DISINTEGRATING ORAL EVERY 8 HOURS PRN
COMMUNITY
Start: 2025-01-27

## 2025-03-20 ENCOUNTER — OFFICE VISIT (OUTPATIENT)
Dept: GASTROENTEROLOGY | Facility: CLINIC | Age: 65
End: 2025-03-20
Payer: COMMERCIAL

## 2025-03-20 VITALS
BODY MASS INDEX: 24.33 KG/M2 | WEIGHT: 155 LBS | RESPIRATION RATE: 15 BRPM | HEART RATE: 82 BPM | OXYGEN SATURATION: 98 % | SYSTOLIC BLOOD PRESSURE: 100 MMHG | TEMPERATURE: 97.5 F | HEIGHT: 67 IN | DIASTOLIC BLOOD PRESSURE: 80 MMHG

## 2025-03-20 DIAGNOSIS — K22.70 BARRETT'S ESOPHAGUS WITHOUT DYSPLASIA: ICD-10-CM

## 2025-03-20 DIAGNOSIS — R10.13 EPIGASTRIC PAIN: Primary | ICD-10-CM

## 2025-03-20 DIAGNOSIS — R19.8 ALTERNATING CONSTIPATION AND DIARRHEA: ICD-10-CM

## 2025-03-20 DIAGNOSIS — Z12.11 SCREENING FOR COLON CANCER: ICD-10-CM

## 2025-03-20 DIAGNOSIS — Z72.0 TOBACCO USE: ICD-10-CM

## 2025-03-20 PROCEDURE — 99214 OFFICE O/P EST MOD 30 MIN: CPT | Performed by: PHYSICIAN ASSISTANT

## 2025-03-20 RX ORDER — SODIUM CHLORIDE, SODIUM LACTATE, POTASSIUM CHLORIDE, CALCIUM CHLORIDE 600; 310; 30; 20 MG/100ML; MG/100ML; MG/100ML; MG/100ML
125 INJECTION, SOLUTION INTRAVENOUS CONTINUOUS
Status: CANCELLED | OUTPATIENT
Start: 2025-03-20

## 2025-03-20 RX ORDER — OMEPRAZOLE 40 MG/1
40 CAPSULE, DELAYED RELEASE ORAL 2 TIMES DAILY
Qty: 60 CAPSULE | Refills: 5 | Status: SHIPPED | OUTPATIENT
Start: 2025-03-20 | End: 2025-03-21

## 2025-03-20 NOTE — PROGRESS NOTES
Name: Bridget Laguna      : 1960      MRN: [id number]  Encounter Provider: Marsha Luu PA-C  Encounter Date: 3/20/2025   Encounter department: [facility] [place]  :  Assessment & Plan  Epigastric pain  This pt has been dealing with epigastric pain for an unclear period of time.  She has a history of GERD and Steward's esophagus, she had ER evaluation x 2 in 2025 with negative ACS workup and PE study negative.  She is s/p cholecystectomy.  Unclear as to whether this represents an underlying peptic process versus alternate etiology.  I reviewed with pt non-GI etiol are also possible.   She had previously been on alternate PPI and found this to be more effective, I will adjust regimen to reflect this and we can see if this is covered by her insurance.  While it is early for repeat EGD for Steward's surveillance, given her treatment refractory symptoms we will proceed with an EGD now.  If this is unremarkable we may wish to consider additional investigation with a gastric emptying study and potentially vascular studies.    I have also instructed her to discontinue dicyclomine as she has not found this helpful and there may be potential GI side effects which may be affecting bowels.    Recommend diet and lifestyle modifications for GERD.  This includes avoiding spicy, saucy, greasy/oily foods, citrus, EtOH, NSAIDs, tobacco.  Avoid eating within 2 to 3 hours of bed.  Elevating height of bed 6 inches on blocks may be beneficial.    Orders:    omeprazole (PriLOSEC) 40 MG capsule; Take 1 capsule (40 mg total) by mouth 2 (two) times a day    EGD; Future    Steward's esophagus without dysplasia  History of such on last EGD in 2024.  Recommend indefinite PPI therapy.  Original surveillance interval of 3 years which took her to 2027, EGD being completed earlier for diagnostic purposes.  Strongly encourage smoking cessation.       Alternating constipation and diarrhea  Appears  to have some issues with sporadic stool output and alternating bowel habits between constipation and diarrhea.  Recommend initiating Benefiber supplementation now.   Stay hydrated with at least 64 ounces of noncaffeinated beverages daily.  Consider x-ray in the future to evaluate stool burden if issues with stool output persist.    Pertaining to her rectal bulge, she declined examination today and feels it has resolved.  I did review with her etiology is unclear, may have represented a prolapsing internal hemorrhoid versus some type of prolapse.  This can be evaluated in greater detail during colonoscopy as well.       Screening for colon cancer  Exact timing of patient's previous colonoscopy is unclear, though after discussion today she and her spouse now believes that it may have been anywhere from 6 to 10 years from her last colonoscopy.  We will plan to attempt colonoscopy now.  Patient does not believe she would be able to complete a large volume cleanse, and therefore we will complete the MiraLAX/Dulcolax/Gatorade prep.    I discussed informed consent with the patient. The risks/benefits/alternatives of the procedure were discussed with the patient. Risks included, but not limited to, infection, bleeding, perforation, injury to organs in the abdomen, missed lesion and incomplete procedure were discussed. Patient was agreeable.  Orders:    Colonoscopy; Future    Tobacco use  Strongly encourage smoking cessation.         We will follow up after bidirectional endoscopic evaluation is completed.     History of Present Illness   HPI  Bridget Laguna is a 64 y.o. female who presents for evaluation of abd pain. Pmhx sig for COPD, Steward's, bipolar disorder, factor V Leiden, tobacco use disorder.  History of cholecystectomy.    History obtained from: patient    This patient was last evaluated by Dr. Farr in 02/2024.  She had a history of Steward's esophagus and at time of last office visit was complaining of  epigastric and left upper quadrant pain.  She was instructed to take PPI twice daily.    She was evaluated in the ER twice in 01/2025 for chest and abdominal pain. She had ACS workup which was negative.  CT C/A/P was also unremarkable, no evidence of PE.  She was evaluated by [facility] general surgery team and scheduled for EGD and colonoscopy, though feels wait for procedure time is unreasonable and is here today to discuss.    She describes months if not longer of an ache and a sharp pain in the epigastric region that radiates around to her back at times.  She describes the pain as being an 8 out of 10.  Seems to be present almost all the time though worsens postprandially.  She does not notice a change in the pain with defecation.  She endorses nausea though no emesis.  No dysphagia or odynophagia. Per office scale she is down 4 pounds since last year.  She is on pantoprazole which she does not find effective.  She was previously on omeprazole which worked better for her though she shares insurance did not cover.  She feels Carafate made her symptoms worse.    A couple of weeks ago she was also having shooting pain and cramping in her rectum as well as a protrusion from her rectum.  She was told that she may have had a prolapse though this has since resolved.  Her bowels alternate between constipation and diarrhea.  She denies any BRBPR or melena.  No nocturnal BMs.  She believes her last colonoscopy was anywhere from 6 to 10 years ago.    06/2024: NM Stress: no infarct or ischemia  01/2025: CT A/P: No evidence of mass, bowel obstruction or inflammatory change   01/2025: Hb 14.0, MCV 89, Plt 204, BUN 10, Cr 1.06, SAT 20, ALT 12, ALP 84, albumin 4.5, t bili 0.5     NSAIDs: none  Tobacco: 1/2 PPD   Etoh: none    Endoscopic history:   EGD: 02/2024: [place]-colored mucosa with islands in the Z-line  A. Duodenum, biopsy: Benign duodenal mucosa within normal limits. No intraepithelial lymphocytosis and no villous  blunting. Negative for dysplasia and malignancy.   B. Stomach, biopsy: Antral-type gastric mucosa with reactive gastropathy. Oxyntic-type gastric mucosa within normal limits. No Helicobacter organisms identified on H&E. Negative for intestinal metaplasia, dysplasia, and malignancy.   C. Esophagogastric junction, biopsy: Squamocolumnar junctional mucosa with intestinal metaplasia, consistent with Steward's esophagus. Negative for dysplasia and malignancy  Colon:     Review of Systems  Per HPI    Past Medical History   Past Medical History:   Diagnosis Date    Avascular necrosis (HCC)     Steward's esophagus     Cancer (HCC)     basal cell    COPD (chronic obstructive pulmonary disease) (HCC)     Factor 5 Leiden mutation, heterozygous (HCC)     Hyperlipidemia      Past Surgical History:   Procedure Laterality Date    CHOLECYSTECTOMY      EGD      TONSILLECTOMY       History reviewed. No pertinent family history.   reports that she has been smoking cigarettes. She has never used smokeless tobacco. She reports that she does not currently use alcohol. She reports that she does not currently use drugs.  Current Outpatient Medications   Medication Instructions    albuterol (PROVENTIL HFA,VENTOLIN HFA) 90 mcg/act inhaler Inhale    albuterol 2.5 mg, Nebulization, Every 6 hours PRN    clonazePAM (KLONOPIN) 1 mg, 3 times daily    dicyclomine (BENTYL) 20 mg, 3 times daily    gabapentin (NEURONTIN) 600 mg, 3 times daily    methocarbamol (ROBAXIN) 500 mg, 3 times daily PRN    omeprazole (PRILOSEC) 40 mg, Oral, 2 times daily    ondansetron (ZOFRAN-ODT) 4 mg, Every 8 hours PRN    pantoprazole (PROTONIX) 40 mg, Daily    PARoxetine (PAXIL) 40 mg, Daily    QUEtiapine (SEROQUEL) 800 mg, Daily at bedtime    sucralfate (CARAFATE) 1 g/10 mL suspension take 10 MILLILITER by mouth four times a day AS NEEDED (ABDOMINAL)     Allergies   Allergen Reactions    Ketorolac Other (See Comments)     headache         Objective   /80 (BP  "Location: Right arm, Patient Position: Sitting, Cuff Size: Adult)   Pulse 82   Temp 97.5 °F (36.4 °C) (Temporal)   Resp 15   Ht 5' 7\" (1.702 m)   Wt 70.3 kg (155 lb)   SpO2 98%   BMI 24.28 kg/m²      Physical Exam  Vitals and nursing note reviewed.   Constitutional:       General: She is not in acute distress.     Appearance: She is well-developed.      Comments: Appears older than stated age   HENT:      Head: Normocephalic and atraumatic.   Eyes:      General: No scleral icterus.     Conjunctiva/sclera: Conjunctivae normal.   Cardiovascular:      Rate and Rhythm: Normal rate and regular rhythm.      Heart sounds: No murmur heard.  Pulmonary:      Effort: Pulmonary effort is normal. No respiratory distress.      Breath sounds: Normal breath sounds.   Abdominal:      General: Bowel sounds are normal. There is no distension.      Palpations: Abdomen is soft.      Tenderness: There is abdominal tenderness. There is no guarding or rebound.   Genitourinary:     Comments: Rectal exam declined today  Musculoskeletal:         General: No swelling.      Cervical back: Neck supple.   Skin:     General: Skin is warm and dry.      Coloration: Skin is not jaundiced.   Neurological:      General: No focal deficit present.      Mental Status: She is alert.   Psychiatric:         Mood and Affect: Mood normal.         Behavior: Behavior normal.       **Please note:  Dictation voice to text software may have been used in the creation of this record.  Occasional wrong word or “sound alike” substitutions may have occurred due to the inherent limitations of voice recognition software.  Read the chart carefully and recognize, using context, where substitutions have occurred.**  "

## 2025-03-20 NOTE — H&P (VIEW-ONLY)
Name: Bridget Laguna      : 1960      MRN: [id number]  Encounter Provider: Marsha Luu PA-C  Encounter Date: 3/20/2025   Encounter department: [facility] [place]  :  Assessment & Plan  Epigastric pain  This pt has been dealing with epigastric pain for an unclear period of time.  She has a history of GERD and Steward's esophagus, she had ER evaluation x 2 in 2025 with negative ACS workup and PE study negative.  She is s/p cholecystectomy.  Unclear as to whether this represents an underlying peptic process versus alternate etiology.  I reviewed with pt non-GI etiol are also possible.   She had previously been on alternate PPI and found this to be more effective, I will adjust regimen to reflect this and we can see if this is covered by her insurance.  While it is early for repeat EGD for Steward's surveillance, given her treatment refractory symptoms we will proceed with an EGD now.  If this is unremarkable we may wish to consider additional investigation with a gastric emptying study and potentially vascular studies.    I have also instructed her to discontinue dicyclomine as she has not found this helpful and there may be potential GI side effects which may be affecting bowels.    Recommend diet and lifestyle modifications for GERD.  This includes avoiding spicy, saucy, greasy/oily foods, citrus, EtOH, NSAIDs, tobacco.  Avoid eating within 2 to 3 hours of bed.  Elevating height of bed 6 inches on blocks may be beneficial.    Orders:    omeprazole (PriLOSEC) 40 MG capsule; Take 1 capsule (40 mg total) by mouth 2 (two) times a day    EGD; Future    Steward's esophagus without dysplasia  History of such on last EGD in 2024.  Recommend indefinite PPI therapy.  Original surveillance interval of 3 years which took her to 2027, EGD being completed earlier for diagnostic purposes.  Strongly encourage smoking cessation.       Alternating constipation and diarrhea  Appears  to have some issues with sporadic stool output and alternating bowel habits between constipation and diarrhea.  Recommend initiating Benefiber supplementation now.   Stay hydrated with at least 64 ounces of noncaffeinated beverages daily.  Consider x-ray in the future to evaluate stool burden if issues with stool output persist.    Pertaining to her rectal bulge, she declined examination today and feels it has resolved.  I did review with her etiology is unclear, may have represented a prolapsing internal hemorrhoid versus some type of prolapse.  This can be evaluated in greater detail during colonoscopy as well.       Screening for colon cancer  Exact timing of patient's previous colonoscopy is unclear, though after discussion today she and her spouse now believes that it may have been anywhere from 6 to 10 years from her last colonoscopy.  We will plan to attempt colonoscopy now.  Patient does not believe she would be able to complete a large volume cleanse, and therefore we will complete the MiraLAX/Dulcolax/Gatorade prep.    I discussed informed consent with the patient. The risks/benefits/alternatives of the procedure were discussed with the patient. Risks included, but not limited to, infection, bleeding, perforation, injury to organs in the abdomen, missed lesion and incomplete procedure were discussed. Patient was agreeable.  Orders:    Colonoscopy; Future    Tobacco use  Strongly encourage smoking cessation.         We will follow up after bidirectional endoscopic evaluation is completed.     History of Present Illness   HPI  Bridget Laguna is a 64 y.o. female who presents for evaluation of abd pain. Pmhx sig for COPD, Steward's, bipolar disorder, factor V Leiden, tobacco use disorder.  History of cholecystectomy.    History obtained from: patient    This patient was last evaluated by Dr. Farr in 02/2024.  She had a history of Steward's esophagus and at time of last office visit was complaining of  epigastric and left upper quadrant pain.  She was instructed to take PPI twice daily.    She was evaluated in the ER twice in 01/2025 for chest and abdominal pain. She had ACS workup which was negative.  CT C/A/P was also unremarkable, no evidence of PE.  She was evaluated by [facility] general surgery team and scheduled for EGD and colonoscopy, though feels wait for procedure time is unreasonable and is here today to discuss.    She describes months if not longer of an ache and a sharp pain in the epigastric region that radiates around to her back at times.  She describes the pain as being an 8 out of 10.  Seems to be present almost all the time though worsens postprandially.  She does not notice a change in the pain with defecation.  She endorses nausea though no emesis.  No dysphagia or odynophagia. Per office scale she is down 4 pounds since last year.  She is on pantoprazole which she does not find effective.  She was previously on omeprazole which worked better for her though she shares insurance did not cover.  She feels Carafate made her symptoms worse.    A couple of weeks ago she was also having shooting pain and cramping in her rectum as well as a protrusion from her rectum.  She was told that she may have had a prolapse though this has since resolved.  Her bowels alternate between constipation and diarrhea.  She denies any BRBPR or melena.  No nocturnal BMs.  She believes her last colonoscopy was anywhere from 6 to 10 years ago.    06/2024: NM Stress: no infarct or ischemia  01/2025: CT A/P: No evidence of mass, bowel obstruction or inflammatory change   01/2025: Hb 14.0, MCV 89, Plt 204, BUN 10, Cr 1.06, SAT 20, ALT 12, ALP 84, albumin 4.5, t bili 0.5     NSAIDs: none  Tobacco: 1/2 PPD   Etoh: none    Endoscopic history:   EGD: 02/2024: [place]-colored mucosa with islands in the Z-line  A. Duodenum, biopsy: Benign duodenal mucosa within normal limits. No intraepithelial lymphocytosis and no villous  blunting. Negative for dysplasia and malignancy.   B. Stomach, biopsy: Antral-type gastric mucosa with reactive gastropathy. Oxyntic-type gastric mucosa within normal limits. No Helicobacter organisms identified on H&E. Negative for intestinal metaplasia, dysplasia, and malignancy.   C. Esophagogastric junction, biopsy: Squamocolumnar junctional mucosa with intestinal metaplasia, consistent with Steward's esophagus. Negative for dysplasia and malignancy  Colon:     Review of Systems  Per HPI    Past Medical History   Past Medical History:   Diagnosis Date    Avascular necrosis (HCC)     Steward's esophagus     Cancer (HCC)     basal cell    COPD (chronic obstructive pulmonary disease) (HCC)     Factor 5 Leiden mutation, heterozygous (HCC)     Hyperlipidemia      Past Surgical History:   Procedure Laterality Date    CHOLECYSTECTOMY      EGD      TONSILLECTOMY       History reviewed. No pertinent family history.   reports that she has been smoking cigarettes. She has never used smokeless tobacco. She reports that she does not currently use alcohol. She reports that she does not currently use drugs.  Current Outpatient Medications   Medication Instructions    albuterol (PROVENTIL HFA,VENTOLIN HFA) 90 mcg/act inhaler Inhale    albuterol 2.5 mg, Nebulization, Every 6 hours PRN    clonazePAM (KLONOPIN) 1 mg, 3 times daily    dicyclomine (BENTYL) 20 mg, 3 times daily    gabapentin (NEURONTIN) 600 mg, 3 times daily    methocarbamol (ROBAXIN) 500 mg, 3 times daily PRN    omeprazole (PRILOSEC) 40 mg, Oral, 2 times daily    ondansetron (ZOFRAN-ODT) 4 mg, Every 8 hours PRN    pantoprazole (PROTONIX) 40 mg, Daily    PARoxetine (PAXIL) 40 mg, Daily    QUEtiapine (SEROQUEL) 800 mg, Daily at bedtime    sucralfate (CARAFATE) 1 g/10 mL suspension take 10 MILLILITER by mouth four times a day AS NEEDED (ABDOMINAL)     Allergies   Allergen Reactions    Ketorolac Other (See Comments)     headache         Objective   /80 (BP  "Location: Right arm, Patient Position: Sitting, Cuff Size: Adult)   Pulse 82   Temp 97.5 °F (36.4 °C) (Temporal)   Resp 15   Ht 5' 7\" (1.702 m)   Wt 70.3 kg (155 lb)   SpO2 98%   BMI 24.28 kg/m²      Physical Exam  Vitals and nursing note reviewed.   Constitutional:       General: She is not in acute distress.     Appearance: She is well-developed.      Comments: Appears older than stated age   HENT:      Head: Normocephalic and atraumatic.   Eyes:      General: No scleral icterus.     Conjunctiva/sclera: Conjunctivae normal.   Cardiovascular:      Rate and Rhythm: Normal rate and regular rhythm.      Heart sounds: No murmur heard.  Pulmonary:      Effort: Pulmonary effort is normal. No respiratory distress.      Breath sounds: Normal breath sounds.   Abdominal:      General: Bowel sounds are normal. There is no distension.      Palpations: Abdomen is soft.      Tenderness: There is abdominal tenderness. There is no guarding or rebound.   Genitourinary:     Comments: Rectal exam declined today  Musculoskeletal:         General: No swelling.      Cervical back: Neck supple.   Skin:     General: Skin is warm and dry.      Coloration: Skin is not jaundiced.   Neurological:      General: No focal deficit present.      Mental Status: She is alert.   Psychiatric:         Mood and Affect: Mood normal.         Behavior: Behavior normal.       **Please note:  Dictation voice to text software may have been used in the creation of this record.  Occasional wrong word or “sound alike” substitutions may have occurred due to the inherent limitations of voice recognition software.  Read the chart carefully and recognize, using context, where substitutions have occurred.**  "

## 2025-03-21 NOTE — ASSESSMENT & PLAN NOTE
History of such on last EGD in 02/2024.  Recommend indefinite PPI therapy.  Original surveillance interval of 3 years which took her to 02/2027, EGD being completed earlier for diagnostic purposes.  Strongly encourage smoking cessation.

## 2025-03-27 ENCOUNTER — HOSPITAL ENCOUNTER (OUTPATIENT)
Dept: GASTROENTEROLOGY | Facility: HOSPITAL | Age: 65
Setting detail: OUTPATIENT SURGERY
End: 2025-03-27
Payer: COMMERCIAL

## 2025-03-27 ENCOUNTER — ANESTHESIA EVENT (OUTPATIENT)
Dept: GASTROENTEROLOGY | Facility: HOSPITAL | Age: 65
End: 2025-03-27
Payer: COMMERCIAL

## 2025-03-27 ENCOUNTER — ANESTHESIA (OUTPATIENT)
Dept: GASTROENTEROLOGY | Facility: HOSPITAL | Age: 65
End: 2025-03-27
Payer: COMMERCIAL

## 2025-03-27 VITALS
HEART RATE: 83 BPM | TEMPERATURE: 96.7 F | WEIGHT: 155 LBS | RESPIRATION RATE: 18 BRPM | BODY MASS INDEX: 24.33 KG/M2 | OXYGEN SATURATION: 99 % | HEIGHT: 67 IN | SYSTOLIC BLOOD PRESSURE: 134 MMHG | DIASTOLIC BLOOD PRESSURE: 66 MMHG

## 2025-03-27 DIAGNOSIS — Z12.11 SCREENING FOR COLON CANCER: ICD-10-CM

## 2025-03-27 DIAGNOSIS — R11.0 NAUSEA: ICD-10-CM

## 2025-03-27 DIAGNOSIS — R63.4 WEIGHT LOSS, ABNORMAL: ICD-10-CM

## 2025-03-27 DIAGNOSIS — R68.81 EARLY SATIETY: Primary | ICD-10-CM

## 2025-03-27 DIAGNOSIS — R10.13 EPIGASTRIC PAIN: ICD-10-CM

## 2025-03-27 PROCEDURE — G0121 COLON CA SCRN NOT HI RSK IND: HCPCS | Performed by: INTERNAL MEDICINE

## 2025-03-27 PROCEDURE — 43239 EGD BIOPSY SINGLE/MULTIPLE: CPT | Performed by: INTERNAL MEDICINE

## 2025-03-27 PROCEDURE — 88305 TISSUE EXAM BY PATHOLOGIST: CPT | Performed by: PATHOLOGY

## 2025-03-27 RX ORDER — SODIUM CHLORIDE, SODIUM LACTATE, POTASSIUM CHLORIDE, CALCIUM CHLORIDE 600; 310; 30; 20 MG/100ML; MG/100ML; MG/100ML; MG/100ML
125 INJECTION, SOLUTION INTRAVENOUS CONTINUOUS
Status: CANCELLED | OUTPATIENT
Start: 2025-03-27

## 2025-03-27 RX ORDER — PROPOFOL 10 MG/ML
INJECTION, EMULSION INTRAVENOUS CONTINUOUS PRN
Status: DISCONTINUED | OUTPATIENT
Start: 2025-03-27 | End: 2025-03-27

## 2025-03-27 RX ORDER — LIDOCAINE HYDROCHLORIDE 20 MG/ML
INJECTION, SOLUTION EPIDURAL; INFILTRATION; INTRACAUDAL; PERINEURAL AS NEEDED
Status: DISCONTINUED | OUTPATIENT
Start: 2025-03-27 | End: 2025-03-27

## 2025-03-27 RX ORDER — LIDOCAINE HYDROCHLORIDE 10 MG/ML
0.5 INJECTION, SOLUTION EPIDURAL; INFILTRATION; INTRACAUDAL; PERINEURAL ONCE AS NEEDED
Status: ACTIVE | OUTPATIENT
Start: 2025-03-27

## 2025-03-27 RX ORDER — PROPOFOL 10 MG/ML
INJECTION, EMULSION INTRAVENOUS AS NEEDED
Status: DISCONTINUED | OUTPATIENT
Start: 2025-03-27 | End: 2025-03-27

## 2025-03-27 RX ORDER — SODIUM CHLORIDE, SODIUM LACTATE, POTASSIUM CHLORIDE, CALCIUM CHLORIDE 600; 310; 30; 20 MG/100ML; MG/100ML; MG/100ML; MG/100ML
125 INJECTION, SOLUTION INTRAVENOUS CONTINUOUS
Status: DISPENSED | OUTPATIENT
Start: 2025-03-27

## 2025-03-27 RX ORDER — LIDOCAINE HYDROCHLORIDE 10 MG/ML
0.5 INJECTION, SOLUTION EPIDURAL; INFILTRATION; INTRACAUDAL; PERINEURAL ONCE AS NEEDED
Status: CANCELLED | OUTPATIENT
Start: 2025-03-27

## 2025-03-27 RX ORDER — SODIUM CHLORIDE, SODIUM LACTATE, POTASSIUM CHLORIDE, CALCIUM CHLORIDE 600; 310; 30; 20 MG/100ML; MG/100ML; MG/100ML; MG/100ML
125 INJECTION, SOLUTION INTRAVENOUS CONTINUOUS
Status: DISCONTINUED | OUTPATIENT
Start: 2025-03-27 | End: 2025-03-27

## 2025-03-27 RX ADMIN — PROPOFOL 30 MG: 10 INJECTION, EMULSION INTRAVENOUS at 07:41

## 2025-03-27 RX ADMIN — LIDOCAINE HYDROCHLORIDE 80 MG: 20 INJECTION, SOLUTION EPIDURAL; INFILTRATION; INTRACAUDAL; PERINEURAL at 07:32

## 2025-03-27 RX ADMIN — SODIUM CHLORIDE, SODIUM LACTATE, POTASSIUM CHLORIDE, AND CALCIUM CHLORIDE 125 ML/HR: .6; .31; .03; .02 INJECTION, SOLUTION INTRAVENOUS at 07:03

## 2025-03-27 RX ADMIN — PROPOFOL 150 MG: 10 INJECTION, EMULSION INTRAVENOUS at 07:34

## 2025-03-27 RX ADMIN — PROPOFOL 100 MCG/KG/MIN: 10 INJECTION, EMULSION INTRAVENOUS at 07:49

## 2025-03-27 RX ADMIN — TOPICAL ANESTHETIC 1 SPRAY: 200 SPRAY DENTAL; PERIODONTAL at 07:34

## 2025-03-27 RX ADMIN — PROPOFOL 30 MG: 10 INJECTION, EMULSION INTRAVENOUS at 07:48

## 2025-03-27 NOTE — ANESTHESIA PREPROCEDURE EVALUATION
Procedure:  COLONOSCOPY  EGD    Presumptive COPD on albuterol    Relevant Problems   HEMATOLOGY   (+) Factor V deficiency, congenital (HCC)      MUSCULOSKELETAL   (+) Chronic bilateral thoracic back pain      NEURO/PSYCH   (+) Chronic bilateral thoracic back pain      PULMONARY   (+) Chronic airway obstruction (HCC)        Physical Exam    Airway    Mallampati score: II  TM Distance: >3 FB  Neck ROM: full     Dental    upper dentures and lower dentures,     Cardiovascular  Rhythm: regular, Rate: normal, Cardiovascular exam normal    Pulmonary  Pulmonary exam normal Breath sounds clear to auscultation    Other Findings  post-pubertal.      Anesthesia Plan  ASA Score- 3     Anesthesia Type- IV sedation with anesthesia with ASA Monitors.         Additional Monitors:     Airway Plan:     Comment: Discussed risks/benefits, including medication reactions, awareness, aspiration, and serious/life threatening complications. Plan to maintain native airway with IVGA, monitored with EtCO2.       Plan Factors-Exercise tolerance (METS): <4 METS.    Chart reviewed.    Patient summary reviewed.      Patient instructed to abstain from smoking on day of procedure. Patient did not smoke on day of surgery.            Induction- intravenous.    Postoperative Plan-         Informed Consent- Anesthetic plan and risks discussed with patient.  I personally reviewed this patient with the CRNA. Discussed and agreed on the Anesthesia Plan with the CRNA..      NPO Status:  Vitals Value Taken Time   Date of last liquid 03/27/25 03/27/25 0634   Time of last liquid 0200 03/27/25 0634   Date of last solid 03/25/25 03/27/25 0634   Time of last solid 1800 03/27/25 0634

## 2025-03-27 NOTE — DISCHARGE INSTR - AVS FIRST PAGE
Await pathology results   Agree with changing pantoprazole to omeprazole.  Please see if you get your pharmacy to approve this.  Stop using all NSAIDs including Motrin, Advil, Aleve, ibuprofen, Excedrin, meloxicam, Celebrex etc.  Follow-up with Marsha Luu or Dr. Farr in the gastroenterology office.  Please call for an appointment.  Proceed with colonoscopy today  Given retained food in the stomach and your symptoms of feeling full very soon after eating suggest gastric emptying scan.  -Please call the Gastroenterology office at [phone] for biopsy results if you do  not hear from our office in 14 days.  -Follow-up with primary care doctor as previously scheduled  -Please call gastroenterology physician on call or go to the emergency department if you develop abdominal pain, rectal bleeding greater than 1 tbsp, black stools, fever greater than 100.5, persistent nausea or vomiting.  Gastroenterology office phone number is [phone].  Suggest low residue diet for now, you can look up the [facility] low residue diet     RECOMMENDATION:  Repeat colonoscopy in 1 year, due: 3/27/2026  Inadequate bowel preparation      You should have a repeat colonoscopy within 1 year with a more extended preparation.  There is no evidence of colon mass on today's examination although small or flat polyps may have been missed.  Follow-up with Marsha Luu or Dr. Farr in the gastroenterology office.  Start Benefiber 2 teaspoonfuls mixed in 6 to 8 ounce of noncarbonated liquid daily to see if you can regulate your bowel pattern.  Schedule gastric emptying scan  Please see comments under EGD  Follow-up with primary care provider

## 2025-03-27 NOTE — INTERVAL H&P NOTE
H&P reviewed. After examining the patient I find no changes in the patients condition since the H&P had been written.  Patient reports continued weight loss up to 10 pounds in the last 3 weeks.  Also reports epigastric pain and early satiety.  Pain is worse with eating.  Tends to wrap around the right side of her back.  She does have nausea but no vomiting.  Her bowels alternate between diarrhea and constipation.  Please refer to Marsha's note for details of her medical history.  She should follow-up following these procedures with Marsha or Dr. Farr.    Vitals:    03/27/25 0648   BP: 133/65   Pulse: 95   Resp: 18   Temp: 97.8 °F (36.6 °C)   SpO2: 95%

## 2025-03-27 NOTE — ANESTHESIA POSTPROCEDURE EVALUATION
Post-Op Assessment Note    CV Status:  Stable  Pain Score: 0    Pain management: adequate       Mental Status:  Arousable   Hydration Status:  Stable   PONV Controlled:  None   Airway Patency:  Patent     Post Op Vitals Reviewed: Yes    No anethesia notable event occurred.    Staff: CRNA           Last Filed PACU Vitals:  Vitals Value Taken Time   Temp 96.7 °F (35.9 °C) 03/27/25 0825   Pulse 87 03/27/25 0825   /81    Resp 16    SpO2 100 % 03/27/25 0825

## 2025-03-28 ENCOUNTER — TELEPHONE (OUTPATIENT)
Age: 65
End: 2025-03-28

## 2025-03-28 ENCOUNTER — NURSE TRIAGE (OUTPATIENT)
Age: 65
End: 2025-03-28

## 2025-03-28 DIAGNOSIS — R19.5 ABNORMAL FINDINGS IN STOOL: ICD-10-CM

## 2025-03-28 DIAGNOSIS — R10.9 ABDOMINAL CRAMPING: Primary | ICD-10-CM

## 2025-03-28 NOTE — TELEPHONE ENCOUNTER
Patient had colonoscopy 3/27 and has been having trouble controlling her bowels. Call transferred to nurse triage to further assist.

## 2025-03-28 NOTE — TELEPHONE ENCOUNTER
Received message from CHRISTIAN Fields from GI that patient called with concerns about procedure. Called and spoke with patient. Patient stated she had talked with Dr Alcantara this morning and he answered all of her questions and addressed her concerns. Reports she has been doing better throughout the course of today. No further concerns at this time.

## 2025-03-28 NOTE — TELEPHONE ENCOUNTER
Patients GI provider: ISIDORO Luu     Number to return call: [phone]    Reason for call: Pt called stating she received a call this afternoon. Notes are from the morning Sonya spoke to pt.    Scheduled procedure/appointment date if applicable: 9/3/2025

## 2025-03-28 NOTE — TELEPHONE ENCOUNTER
CHRISTIAN Sosa    3/28/25  3:12 PM  Note      Received message from CHRISTIAN Fields from GI that patient called with concerns about procedure. Called and spoke with patient. Patient stated she had talked with Dr Alcantara this morning and he answered all of her questions and addressed her concerns. Reports she has been doing better throughout the course of today. No further concerns at this time.

## 2025-03-28 NOTE — TELEPHONE ENCOUNTER
"FOLLOW UP: Please advise.    REASON FOR CONVERSATION: black stool after colonoscopy    SYMPTOMS: Passed lg amt foul smelling, black liquid after colonoscopy yesterday x 2, bright red blood on TP when wiping, very mild abd cramping    OTHER: Pt states she first passed clear liquid first.  She has not had any further stools today. Denies hx of hemorrhoid    DISPOSITION: Send Message to Task Provider Pool    Reason for Disposition   [1] Caller has URGENT question or concern AND [2] triager unable to answer question    Answer Assessment - Initial Assessment Questions  1. DATE/TIME: \"When did you have your endoscopy?\"       3/28/25  2. MAIN CONCERN: \"What is your main concern right now?\" \"What questions do you have?\"      Passed lg amt of clear liquid after colonoscopy, followed by a large amt of black liquid.  One episode of incont of stool.Wiped and had small amt bright red blood on TP  3. ADOMINAL PAIN: \"Are you having any abdominal (belly or stomach) pain?\" If Yes, ask: \"How bad is it?\" (e.g., Scale 1-10; mild, moderate, severe).     - MILD (1-3): doesn't interfere with normal activities, abdomen soft and not tender to touch      - MODERATE (4-7): interferes with normal activities or awakens from sleep, tender to touch      - SEVERE (8-10): excruciating pain, doubled over, unable to do any normal activities        Very mild cramping  4. OTHER SYMPTOMS: \"What other symptoms are you having?\" (e.g., breathing or swallowing problems, chest pain, throat pain, hoarseness, vomiting, stomach pain, bloating, fever, dizziness)      Denies   5. ONSET: \"When did your symptoms start?\"      Yesterday after colonoscopy    Protocols used: GI-Endoscopy (Upper GI) Symptoms and Questions-ADULT-[place]    "

## 2025-03-31 ENCOUNTER — RESULTS FOLLOW-UP (OUTPATIENT)
Dept: GASTROENTEROLOGY | Facility: CLINIC | Age: 65
End: 2025-03-31

## 2025-03-31 ENCOUNTER — TELEPHONE (OUTPATIENT)
Age: 65
End: 2025-03-31

## 2025-03-31 PROCEDURE — 88305 TISSUE EXAM BY PATHOLOGIST: CPT | Performed by: PATHOLOGY

## 2025-03-31 NOTE — RESULT ENCOUNTER NOTE
Please inform patient that biopsy of the stomach is benign and negative for bacteria called Helicobacter pylori.  There was evidence of erosion as noted at time of endoscopy.  I noticed that she called for results but also noted she was having abdominal pain and trouble eating.  I believe that was the indication for the procedure.  Has her pain changed?  I am going to forward this to Marsha and or Dr. Farr both of whom have seen her in the office in the past to follow-up on her symptoms unless of course they are new.  Please recall for repeat EGD in 2 years.  I will copy Marsha and Dr. Farr  Also recall for repeat colonoscopy in 1 year with a more extended bowel preparation.  She was also to schedule gastric emptying scan.  Thank you

## 2025-03-31 NOTE — TELEPHONE ENCOUNTER
Patients GI provider:  Dr. Alcantara    Number to return call: [phone]    Reason for call: Pt called stating that she noticed her results are in from her colon/EGD. She also stated she is having pain in her stomach and is having trouble eating. Please have Dr Alcantara review the results and reach out to pt    Scheduled procedure/appointment date if applicable: Apt/procedure 06/10/25

## 2025-04-01 NOTE — TELEPHONE ENCOUNTER
Patient calling for results.  Discussed results and states understanding.  Patient states has only had 4 tablespoons of oatmeal since procedure on 3/27/25.  Unable to eat due to pain 6/10 and with eating 9/10.  Painful to drink as well - only drinking 2 16 oz bottles of water a day and nothing else.  Patient c/o weakness and dry mouth.  Explained concern for dehydration and went over alarm symptoms.  Is taking omeprazole twice daily.

## 2025-04-02 ENCOUNTER — TELEPHONE (OUTPATIENT)
Age: 65
End: 2025-04-02

## 2025-04-02 NOTE — TELEPHONE ENCOUNTER
Pt calling in again about ongoing pain and not being able to eat much. I advised again the recommendation was to go to the ED. She understood and will go to [facility]

## 2025-04-02 NOTE — TELEPHONE ENCOUNTER
Patients GI provider:  Dr. Farr    Number to return call: [phone]     Reason for call: Pt calling stating she is not able eat and can only drink small amounts of water(sips) due to sever stomach pain. Going on to say every since she had EGD and Colon done by Dr Alcantara on 3/27/2025 she's been feeling bad. / Call transferred to nurse    Scheduled procedure/appointment date if applicable: 6/10/2025 with Alessandro